# Patient Record
Sex: MALE | Race: WHITE | URBAN - METROPOLITAN AREA
[De-identification: names, ages, dates, MRNs, and addresses within clinical notes are randomized per-mention and may not be internally consistent; named-entity substitution may affect disease eponyms.]

---

## 2017-12-01 ENCOUNTER — AMBULATORY - HEALTHEAST (OUTPATIENT)
Dept: ADDICTION MEDICINE | Facility: HOSPITAL | Age: 47
End: 2017-12-01

## 2017-12-01 DIAGNOSIS — F15.20 METHAMPHETAMINE USE DISORDER, SEVERE (H): ICD-10-CM

## 2017-12-05 ENCOUNTER — OFFICE VISIT - HEALTHEAST (OUTPATIENT)
Dept: ADDICTION MEDICINE | Facility: HOSPITAL | Age: 47
End: 2017-12-05

## 2017-12-05 DIAGNOSIS — F15.20 METHAMPHETAMINE USE DISORDER, SEVERE (H): ICD-10-CM

## 2017-12-11 ENCOUNTER — AMBULATORY - HEALTHEAST (OUTPATIENT)
Dept: ADDICTION MEDICINE | Facility: HOSPITAL | Age: 47
End: 2017-12-11

## 2017-12-11 ENCOUNTER — OFFICE VISIT - HEALTHEAST (OUTPATIENT)
Dept: ADDICTION MEDICINE | Facility: HOSPITAL | Age: 47
End: 2017-12-11

## 2017-12-11 DIAGNOSIS — F15.20 METHAMPHETAMINE USE DISORDER, SEVERE (H): ICD-10-CM

## 2017-12-12 ENCOUNTER — OFFICE VISIT - HEALTHEAST (OUTPATIENT)
Dept: ADDICTION MEDICINE | Facility: HOSPITAL | Age: 47
End: 2017-12-12

## 2017-12-12 DIAGNOSIS — F15.20 METHAMPHETAMINE USE DISORDER, SEVERE (H): ICD-10-CM

## 2017-12-13 ENCOUNTER — COMMUNICATION - HEALTHEAST (OUTPATIENT)
Dept: ADDICTION MEDICINE | Facility: HOSPITAL | Age: 47
End: 2017-12-13

## 2017-12-15 ENCOUNTER — AMBULATORY - HEALTHEAST (OUTPATIENT)
Dept: ADDICTION MEDICINE | Facility: HOSPITAL | Age: 47
End: 2017-12-15

## 2017-12-18 ENCOUNTER — OFFICE VISIT - HEALTHEAST (OUTPATIENT)
Dept: ADDICTION MEDICINE | Facility: HOSPITAL | Age: 47
End: 2017-12-18

## 2017-12-18 ENCOUNTER — AMBULATORY - HEALTHEAST (OUTPATIENT)
Dept: LAB | Facility: HOSPITAL | Age: 47
End: 2017-12-18

## 2017-12-18 DIAGNOSIS — F15.20 METHAMPHETAMINE USE DISORDER, SEVERE (H): ICD-10-CM

## 2017-12-19 ENCOUNTER — OFFICE VISIT - HEALTHEAST (OUTPATIENT)
Dept: ADDICTION MEDICINE | Facility: HOSPITAL | Age: 47
End: 2017-12-19

## 2017-12-19 DIAGNOSIS — F15.20 METHAMPHETAMINE USE DISORDER, SEVERE (H): ICD-10-CM

## 2017-12-20 ENCOUNTER — AMBULATORY - HEALTHEAST (OUTPATIENT)
Dept: ADDICTION MEDICINE | Facility: HOSPITAL | Age: 47
End: 2017-12-20

## 2017-12-22 ENCOUNTER — OFFICE VISIT - HEALTHEAST (OUTPATIENT)
Dept: ADDICTION MEDICINE | Facility: HOSPITAL | Age: 47
End: 2017-12-22

## 2017-12-22 DIAGNOSIS — F15.20 METHAMPHETAMINE USE DISORDER, SEVERE (H): ICD-10-CM

## 2017-12-26 ENCOUNTER — COMMUNICATION - HEALTHEAST (OUTPATIENT)
Dept: ADDICTION MEDICINE | Facility: HOSPITAL | Age: 47
End: 2017-12-26

## 2017-12-27 ENCOUNTER — OFFICE VISIT - HEALTHEAST (OUTPATIENT)
Dept: ADDICTION MEDICINE | Facility: HOSPITAL | Age: 47
End: 2017-12-27

## 2017-12-27 ENCOUNTER — AMBULATORY - HEALTHEAST (OUTPATIENT)
Dept: ADDICTION MEDICINE | Facility: HOSPITAL | Age: 47
End: 2017-12-27

## 2017-12-27 DIAGNOSIS — F15.20 METHAMPHETAMINE USE DISORDER, SEVERE (H): ICD-10-CM

## 2018-01-03 ENCOUNTER — AMBULATORY - HEALTHEAST (OUTPATIENT)
Dept: ADDICTION MEDICINE | Facility: HOSPITAL | Age: 48
End: 2018-01-03

## 2018-01-03 ENCOUNTER — COMMUNICATION - HEALTHEAST (OUTPATIENT)
Dept: ADDICTION MEDICINE | Facility: HOSPITAL | Age: 48
End: 2018-01-03

## 2018-01-05 ENCOUNTER — COMMUNICATION - HEALTHEAST (OUTPATIENT)
Dept: ADDICTION MEDICINE | Facility: HOSPITAL | Age: 48
End: 2018-01-05

## 2018-02-12 ENCOUNTER — OFFICE VISIT - HEALTHEAST (OUTPATIENT)
Dept: ADDICTION MEDICINE | Facility: CLINIC | Age: 48
End: 2018-02-12

## 2018-02-12 DIAGNOSIS — F15.20 METHAMPHETAMINE USE DISORDER, SEVERE (H): ICD-10-CM

## 2018-02-13 ENCOUNTER — OFFICE VISIT - HEALTHEAST (OUTPATIENT)
Dept: ADDICTION MEDICINE | Facility: CLINIC | Age: 48
End: 2018-02-13

## 2018-02-13 DIAGNOSIS — F15.20 METHAMPHETAMINE USE DISORDER, SEVERE (H): ICD-10-CM

## 2018-02-14 ENCOUNTER — OFFICE VISIT - HEALTHEAST (OUTPATIENT)
Dept: ADDICTION MEDICINE | Facility: CLINIC | Age: 48
End: 2018-02-14

## 2018-02-14 ENCOUNTER — COMMUNICATION - HEALTHEAST (OUTPATIENT)
Dept: ADDICTION MEDICINE | Facility: CLINIC | Age: 48
End: 2018-02-14

## 2018-02-14 DIAGNOSIS — F15.20 METHAMPHETAMINE USE DISORDER, SEVERE (H): ICD-10-CM

## 2018-02-15 ENCOUNTER — AMBULATORY - HEALTHEAST (OUTPATIENT)
Dept: ADDICTION MEDICINE | Facility: CLINIC | Age: 48
End: 2018-02-15

## 2018-02-16 ENCOUNTER — AMBULATORY - HEALTHEAST (OUTPATIENT)
Dept: ADDICTION MEDICINE | Facility: CLINIC | Age: 48
End: 2018-02-16

## 2018-02-16 ENCOUNTER — COMMUNICATION - HEALTHEAST (OUTPATIENT)
Dept: BEHAVIORAL HEALTH | Facility: CLINIC | Age: 48
End: 2018-02-16

## 2018-02-19 ENCOUNTER — OFFICE VISIT - HEALTHEAST (OUTPATIENT)
Dept: ADDICTION MEDICINE | Facility: CLINIC | Age: 48
End: 2018-02-19

## 2018-02-19 DIAGNOSIS — F15.20 METHAMPHETAMINE USE DISORDER, SEVERE (H): ICD-10-CM

## 2018-02-21 ENCOUNTER — OFFICE VISIT - HEALTHEAST (OUTPATIENT)
Dept: ADDICTION MEDICINE | Facility: CLINIC | Age: 48
End: 2018-02-21

## 2018-02-21 DIAGNOSIS — F15.20 METHAMPHETAMINE USE DISORDER, SEVERE (H): ICD-10-CM

## 2018-02-23 ENCOUNTER — AMBULATORY - HEALTHEAST (OUTPATIENT)
Dept: ADDICTION MEDICINE | Facility: CLINIC | Age: 48
End: 2018-02-23

## 2018-02-26 ENCOUNTER — OFFICE VISIT - HEALTHEAST (OUTPATIENT)
Dept: ADDICTION MEDICINE | Facility: CLINIC | Age: 48
End: 2018-02-26

## 2018-02-26 DIAGNOSIS — F15.20 METHAMPHETAMINE USE DISORDER, SEVERE (H): ICD-10-CM

## 2018-02-27 ENCOUNTER — OFFICE VISIT - HEALTHEAST (OUTPATIENT)
Dept: ADDICTION MEDICINE | Facility: CLINIC | Age: 48
End: 2018-02-27

## 2018-02-27 DIAGNOSIS — F15.20 METHAMPHETAMINE USE DISORDER, SEVERE (H): ICD-10-CM

## 2018-02-28 ENCOUNTER — OFFICE VISIT - HEALTHEAST (OUTPATIENT)
Dept: ADDICTION MEDICINE | Facility: CLINIC | Age: 48
End: 2018-02-28

## 2018-02-28 DIAGNOSIS — F15.20 METHAMPHETAMINE USE DISORDER, SEVERE (H): ICD-10-CM

## 2018-03-02 ENCOUNTER — AMBULATORY - HEALTHEAST (OUTPATIENT)
Dept: ADDICTION MEDICINE | Facility: CLINIC | Age: 48
End: 2018-03-02

## 2018-03-09 ENCOUNTER — COMMUNICATION - HEALTHEAST (OUTPATIENT)
Dept: ADDICTION MEDICINE | Facility: CLINIC | Age: 48
End: 2018-03-09

## 2018-03-09 ENCOUNTER — AMBULATORY - HEALTHEAST (OUTPATIENT)
Dept: ADDICTION MEDICINE | Facility: CLINIC | Age: 48
End: 2018-03-09

## 2018-03-12 ENCOUNTER — OFFICE VISIT - HEALTHEAST (OUTPATIENT)
Dept: ADDICTION MEDICINE | Facility: CLINIC | Age: 48
End: 2018-03-12

## 2018-03-12 DIAGNOSIS — F15.20 METHAMPHETAMINE USE DISORDER, SEVERE (H): ICD-10-CM

## 2018-03-13 ENCOUNTER — OFFICE VISIT - HEALTHEAST (OUTPATIENT)
Dept: ADDICTION MEDICINE | Facility: CLINIC | Age: 48
End: 2018-03-13

## 2018-03-13 DIAGNOSIS — F15.20 METHAMPHETAMINE USE DISORDER, SEVERE (H): ICD-10-CM

## 2018-03-14 ENCOUNTER — AMBULATORY - HEALTHEAST (OUTPATIENT)
Dept: LAB | Facility: CLINIC | Age: 48
End: 2018-03-14

## 2018-03-14 ENCOUNTER — OFFICE VISIT - HEALTHEAST (OUTPATIENT)
Dept: ADDICTION MEDICINE | Facility: CLINIC | Age: 48
End: 2018-03-14

## 2018-03-14 DIAGNOSIS — F15.20 METHAMPHETAMINE USE DISORDER, SEVERE (H): ICD-10-CM

## 2018-03-14 LAB
AMPHETAMINES UR QL SCN: ABNORMAL
BARBITURATES UR QL: ABNORMAL
BENZODIAZ UR QL: ABNORMAL
CANNABINOIDS UR QL SCN: ABNORMAL
COCAINE UR QL: ABNORMAL
CREAT UR-MCNC: 72 MG/DL
METHADONE UR QL SCN: ABNORMAL
OPIATES UR QL SCN: ABNORMAL
OXYCODONE UR QL: ABNORMAL
PCP UR QL SCN: ABNORMAL

## 2018-03-16 ENCOUNTER — OFFICE VISIT - HEALTHEAST (OUTPATIENT)
Dept: ADDICTION MEDICINE | Facility: CLINIC | Age: 48
End: 2018-03-16

## 2018-03-16 DIAGNOSIS — F15.20 METHAMPHETAMINE USE DISORDER, SEVERE (H): ICD-10-CM

## 2018-03-17 ENCOUNTER — AMBULATORY - HEALTHEAST (OUTPATIENT)
Dept: ADDICTION MEDICINE | Facility: CLINIC | Age: 48
End: 2018-03-17

## 2018-03-20 ENCOUNTER — OFFICE VISIT - HEALTHEAST (OUTPATIENT)
Dept: ADDICTION MEDICINE | Facility: CLINIC | Age: 48
End: 2018-03-20

## 2018-03-20 DIAGNOSIS — F15.20 METHAMPHETAMINE USE DISORDER, SEVERE (H): ICD-10-CM

## 2018-03-21 ENCOUNTER — OFFICE VISIT - HEALTHEAST (OUTPATIENT)
Dept: ADDICTION MEDICINE | Facility: CLINIC | Age: 48
End: 2018-03-21

## 2018-03-21 DIAGNOSIS — F15.20 METHAMPHETAMINE USE DISORDER, SEVERE (H): ICD-10-CM

## 2018-03-23 ENCOUNTER — AMBULATORY - HEALTHEAST (OUTPATIENT)
Dept: ADDICTION MEDICINE | Facility: CLINIC | Age: 48
End: 2018-03-23

## 2018-03-26 ENCOUNTER — COMMUNICATION - HEALTHEAST (OUTPATIENT)
Dept: ADDICTION MEDICINE | Facility: CLINIC | Age: 48
End: 2018-03-26

## 2018-03-28 ENCOUNTER — AMBULATORY - HEALTHEAST (OUTPATIENT)
Dept: ADDICTION MEDICINE | Facility: CLINIC | Age: 48
End: 2018-03-28

## 2018-03-29 ENCOUNTER — COMMUNICATION - HEALTHEAST (OUTPATIENT)
Dept: BEHAVIORAL HEALTH | Facility: CLINIC | Age: 48
End: 2018-03-29

## 2018-03-30 ENCOUNTER — AMBULATORY - HEALTHEAST (OUTPATIENT)
Dept: LAB | Facility: CLINIC | Age: 48
End: 2018-03-30

## 2018-03-30 ENCOUNTER — OFFICE VISIT - HEALTHEAST (OUTPATIENT)
Dept: ADDICTION MEDICINE | Facility: CLINIC | Age: 48
End: 2018-03-30

## 2018-03-30 DIAGNOSIS — F15.20 METHAMPHETAMINE USE DISORDER, SEVERE (H): ICD-10-CM

## 2018-03-30 LAB
AMPHETAMINES UR QL SCN: NORMAL
BARBITURATES UR QL: NORMAL
BENZODIAZ UR QL: NORMAL
CANNABINOIDS UR QL SCN: NORMAL
COCAINE UR QL: NORMAL
CREAT UR-MCNC: 196.1 MG/DL
METHADONE UR QL SCN: NORMAL
OPIATES UR QL SCN: NORMAL
OXYCODONE UR QL: NORMAL
PCP UR QL SCN: NORMAL

## 2018-03-31 ENCOUNTER — AMBULATORY - HEALTHEAST (OUTPATIENT)
Dept: ADDICTION MEDICINE | Facility: CLINIC | Age: 48
End: 2018-03-31

## 2018-04-03 ENCOUNTER — OFFICE VISIT - HEALTHEAST (OUTPATIENT)
Dept: ADDICTION MEDICINE | Facility: CLINIC | Age: 48
End: 2018-04-03

## 2018-04-03 DIAGNOSIS — F15.20 METHAMPHETAMINE USE DISORDER, SEVERE (H): ICD-10-CM

## 2018-04-04 ENCOUNTER — OFFICE VISIT - HEALTHEAST (OUTPATIENT)
Dept: ADDICTION MEDICINE | Facility: CLINIC | Age: 48
End: 2018-04-04

## 2018-04-04 DIAGNOSIS — F15.20 METHAMPHETAMINE USE DISORDER, SEVERE (H): ICD-10-CM

## 2018-04-06 ENCOUNTER — OFFICE VISIT - HEALTHEAST (OUTPATIENT)
Dept: ADDICTION MEDICINE | Facility: CLINIC | Age: 48
End: 2018-04-06

## 2018-04-06 ENCOUNTER — AMBULATORY - HEALTHEAST (OUTPATIENT)
Dept: ADDICTION MEDICINE | Facility: CLINIC | Age: 48
End: 2018-04-06

## 2018-04-06 DIAGNOSIS — F15.20 METHAMPHETAMINE USE DISORDER, SEVERE (H): ICD-10-CM

## 2018-04-09 ENCOUNTER — AMBULATORY - HEALTHEAST (OUTPATIENT)
Dept: ADDICTION MEDICINE | Facility: CLINIC | Age: 48
End: 2018-04-09

## 2018-04-09 ENCOUNTER — OFFICE VISIT - HEALTHEAST (OUTPATIENT)
Dept: ADDICTION MEDICINE | Facility: CLINIC | Age: 48
End: 2018-04-09

## 2018-04-09 DIAGNOSIS — F15.20 METHAMPHETAMINE USE DISORDER, SEVERE (H): ICD-10-CM

## 2018-04-11 ENCOUNTER — OFFICE VISIT - HEALTHEAST (OUTPATIENT)
Dept: ADDICTION MEDICINE | Facility: CLINIC | Age: 48
End: 2018-04-11

## 2018-04-11 DIAGNOSIS — F15.20 METHAMPHETAMINE USE DISORDER, SEVERE (H): ICD-10-CM

## 2018-04-13 ENCOUNTER — OFFICE VISIT - HEALTHEAST (OUTPATIENT)
Dept: ADDICTION MEDICINE | Facility: CLINIC | Age: 48
End: 2018-04-13

## 2018-04-13 DIAGNOSIS — F15.20 METHAMPHETAMINE USE DISORDER, SEVERE (H): ICD-10-CM

## 2018-04-16 ENCOUNTER — AMBULATORY - HEALTHEAST (OUTPATIENT)
Dept: ADDICTION MEDICINE | Facility: CLINIC | Age: 48
End: 2018-04-16

## 2018-04-17 ENCOUNTER — OFFICE VISIT - HEALTHEAST (OUTPATIENT)
Dept: ADDICTION MEDICINE | Facility: CLINIC | Age: 48
End: 2018-04-17

## 2018-04-17 DIAGNOSIS — F15.20 METHAMPHETAMINE USE DISORDER, SEVERE (H): ICD-10-CM

## 2018-04-18 ENCOUNTER — OFFICE VISIT - HEALTHEAST (OUTPATIENT)
Dept: ADDICTION MEDICINE | Facility: CLINIC | Age: 48
End: 2018-04-18

## 2018-04-18 DIAGNOSIS — F15.20 METHAMPHETAMINE USE DISORDER, SEVERE (H): ICD-10-CM

## 2018-04-21 ENCOUNTER — AMBULATORY - HEALTHEAST (OUTPATIENT)
Dept: ADDICTION MEDICINE | Facility: CLINIC | Age: 48
End: 2018-04-21

## 2018-04-23 ENCOUNTER — OFFICE VISIT - HEALTHEAST (OUTPATIENT)
Dept: ADDICTION MEDICINE | Facility: CLINIC | Age: 48
End: 2018-04-23

## 2018-04-23 ENCOUNTER — AMBULATORY - HEALTHEAST (OUTPATIENT)
Dept: LAB | Facility: CLINIC | Age: 48
End: 2018-04-23

## 2018-04-23 DIAGNOSIS — F15.20 METHAMPHETAMINE USE DISORDER, SEVERE (H): ICD-10-CM

## 2018-04-23 LAB
AMPHETAMINES UR QL SCN: NORMAL
BARBITURATES UR QL: NORMAL
BENZODIAZ UR QL: NORMAL
CANNABINOIDS UR QL SCN: NORMAL
COCAINE UR QL: NORMAL
CREAT UR-MCNC: 145.9 MG/DL
METHADONE UR QL SCN: NORMAL
OPIATES UR QL SCN: NORMAL
OXYCODONE UR QL: NORMAL
PCP UR QL SCN: NORMAL

## 2018-04-25 ENCOUNTER — OFFICE VISIT - HEALTHEAST (OUTPATIENT)
Dept: BEHAVIORAL HEALTH | Facility: CLINIC | Age: 48
End: 2018-04-25

## 2018-04-25 ENCOUNTER — AMBULATORY - HEALTHEAST (OUTPATIENT)
Dept: ADDICTION MEDICINE | Facility: CLINIC | Age: 48
End: 2018-04-25

## 2018-04-25 DIAGNOSIS — F15.20 METHAMPHETAMINE USE DISORDER, SEVERE (H): ICD-10-CM

## 2018-04-25 DIAGNOSIS — F43.23 ADJUSTMENT DISORDER WITH MIXED ANXIETY AND DEPRESSED MOOD: ICD-10-CM

## 2018-04-29 ENCOUNTER — AMBULATORY - HEALTHEAST (OUTPATIENT)
Dept: ADDICTION MEDICINE | Facility: CLINIC | Age: 48
End: 2018-04-29

## 2018-04-30 ENCOUNTER — OFFICE VISIT - HEALTHEAST (OUTPATIENT)
Dept: ADDICTION MEDICINE | Facility: CLINIC | Age: 48
End: 2018-04-30

## 2018-04-30 DIAGNOSIS — F15.20 METHAMPHETAMINE USE DISORDER, SEVERE (H): ICD-10-CM

## 2018-05-02 ENCOUNTER — OFFICE VISIT - HEALTHEAST (OUTPATIENT)
Dept: ADDICTION MEDICINE | Facility: CLINIC | Age: 48
End: 2018-05-02

## 2018-05-02 DIAGNOSIS — F15.20 METHAMPHETAMINE USE DISORDER, SEVERE (H): ICD-10-CM

## 2018-05-04 ENCOUNTER — AMBULATORY - HEALTHEAST (OUTPATIENT)
Dept: ADDICTION MEDICINE | Facility: CLINIC | Age: 48
End: 2018-05-04

## 2018-05-07 ENCOUNTER — OFFICE VISIT - HEALTHEAST (OUTPATIENT)
Dept: ADDICTION MEDICINE | Facility: CLINIC | Age: 48
End: 2018-05-07

## 2018-05-07 DIAGNOSIS — F15.20 METHAMPHETAMINE USE DISORDER, SEVERE (H): ICD-10-CM

## 2018-05-09 ENCOUNTER — OFFICE VISIT - HEALTHEAST (OUTPATIENT)
Dept: BEHAVIORAL HEALTH | Facility: CLINIC | Age: 48
End: 2018-05-09

## 2018-05-09 DIAGNOSIS — Z91.199 NO-SHOW FOR APPOINTMENT: ICD-10-CM

## 2018-05-11 ENCOUNTER — OFFICE VISIT - HEALTHEAST (OUTPATIENT)
Dept: ADDICTION MEDICINE | Facility: CLINIC | Age: 48
End: 2018-05-11

## 2018-05-11 ENCOUNTER — AMBULATORY - HEALTHEAST (OUTPATIENT)
Dept: ADDICTION MEDICINE | Facility: CLINIC | Age: 48
End: 2018-05-11

## 2018-05-11 DIAGNOSIS — F15.20 METHAMPHETAMINE USE DISORDER, SEVERE (H): ICD-10-CM

## 2018-05-15 ENCOUNTER — COMMUNICATION - HEALTHEAST (OUTPATIENT)
Dept: ADDICTION MEDICINE | Facility: CLINIC | Age: 48
End: 2018-05-15

## 2018-05-16 ENCOUNTER — OFFICE VISIT - HEALTHEAST (OUTPATIENT)
Dept: ADDICTION MEDICINE | Facility: CLINIC | Age: 48
End: 2018-05-16

## 2018-05-16 DIAGNOSIS — F15.20 METHAMPHETAMINE USE DISORDER, SEVERE (H): ICD-10-CM

## 2018-05-17 ENCOUNTER — AMBULATORY - HEALTHEAST (OUTPATIENT)
Dept: ADDICTION MEDICINE | Facility: CLINIC | Age: 48
End: 2018-05-17

## 2018-05-18 ENCOUNTER — OFFICE VISIT - HEALTHEAST (OUTPATIENT)
Dept: BEHAVIORAL HEALTH | Facility: CLINIC | Age: 48
End: 2018-05-18

## 2018-05-18 DIAGNOSIS — Z91.199 NO-SHOW FOR APPOINTMENT: ICD-10-CM

## 2018-05-22 ENCOUNTER — OFFICE VISIT - HEALTHEAST (OUTPATIENT)
Dept: ADDICTION MEDICINE | Facility: CLINIC | Age: 48
End: 2018-05-22

## 2018-05-22 DIAGNOSIS — F15.20 METHAMPHETAMINE USE DISORDER, SEVERE (H): ICD-10-CM

## 2018-05-23 ENCOUNTER — OFFICE VISIT - HEALTHEAST (OUTPATIENT)
Dept: BEHAVIORAL HEALTH | Facility: CLINIC | Age: 48
End: 2018-05-23

## 2018-05-23 DIAGNOSIS — Z91.199 NO-SHOW FOR APPOINTMENT: ICD-10-CM

## 2018-05-24 ENCOUNTER — AMBULATORY - HEALTHEAST (OUTPATIENT)
Dept: ADDICTION MEDICINE | Facility: CLINIC | Age: 48
End: 2018-05-24

## 2018-05-24 ENCOUNTER — OFFICE VISIT - HEALTHEAST (OUTPATIENT)
Dept: BEHAVIORAL HEALTH | Facility: CLINIC | Age: 48
End: 2018-05-24

## 2018-05-24 DIAGNOSIS — Z91.199 NO-SHOW FOR APPOINTMENT: ICD-10-CM

## 2018-05-25 ENCOUNTER — AMBULATORY - HEALTHEAST (OUTPATIENT)
Dept: ADDICTION MEDICINE | Facility: CLINIC | Age: 48
End: 2018-05-25

## 2018-06-01 ENCOUNTER — AMBULATORY - HEALTHEAST (OUTPATIENT)
Dept: LAB | Facility: CLINIC | Age: 48
End: 2018-06-01

## 2018-06-01 ENCOUNTER — OFFICE VISIT - HEALTHEAST (OUTPATIENT)
Dept: ADDICTION MEDICINE | Facility: CLINIC | Age: 48
End: 2018-06-01

## 2018-06-01 ENCOUNTER — AMBULATORY - HEALTHEAST (OUTPATIENT)
Dept: ADDICTION MEDICINE | Facility: CLINIC | Age: 48
End: 2018-06-01

## 2018-06-01 DIAGNOSIS — F15.20 METHAMPHETAMINE USE DISORDER, SEVERE (H): ICD-10-CM

## 2018-06-01 LAB
AMPHETAMINES UR QL SCN: NORMAL
BARBITURATES UR QL: NORMAL
BENZODIAZ UR QL: NORMAL
CANNABINOIDS UR QL SCN: NORMAL
COCAINE UR QL: NORMAL
CREAT UR-MCNC: 46.1 MG/DL
METHADONE UR QL SCN: NORMAL
OPIATES UR QL SCN: NORMAL
OXYCODONE UR QL: NORMAL
PCP UR QL SCN: NORMAL

## 2018-06-05 ENCOUNTER — OFFICE VISIT - HEALTHEAST (OUTPATIENT)
Dept: ADDICTION MEDICINE | Facility: CLINIC | Age: 48
End: 2018-06-05

## 2018-06-05 DIAGNOSIS — F15.20 METHAMPHETAMINE USE DISORDER, SEVERE (H): ICD-10-CM

## 2018-06-08 ENCOUNTER — OFFICE VISIT - HEALTHEAST (OUTPATIENT)
Dept: ADDICTION MEDICINE | Facility: CLINIC | Age: 48
End: 2018-06-08

## 2018-06-08 ENCOUNTER — AMBULATORY - HEALTHEAST (OUTPATIENT)
Dept: ADDICTION MEDICINE | Facility: CLINIC | Age: 48
End: 2018-06-08

## 2018-06-08 DIAGNOSIS — F15.20 METHAMPHETAMINE USE DISORDER, SEVERE (H): ICD-10-CM

## 2018-06-13 ENCOUNTER — OFFICE VISIT - HEALTHEAST (OUTPATIENT)
Dept: ADDICTION MEDICINE | Facility: CLINIC | Age: 48
End: 2018-06-13

## 2018-06-13 DIAGNOSIS — F15.20 METHAMPHETAMINE USE DISORDER, SEVERE (H): ICD-10-CM

## 2018-06-14 ENCOUNTER — COMMUNICATION - HEALTHEAST (OUTPATIENT)
Dept: ADDICTION MEDICINE | Facility: CLINIC | Age: 48
End: 2018-06-14

## 2018-06-15 ENCOUNTER — AMBULATORY - HEALTHEAST (OUTPATIENT)
Dept: ADDICTION MEDICINE | Facility: CLINIC | Age: 48
End: 2018-06-15

## 2018-06-18 ENCOUNTER — OFFICE VISIT - HEALTHEAST (OUTPATIENT)
Dept: ADDICTION MEDICINE | Facility: CLINIC | Age: 48
End: 2018-06-18

## 2018-06-18 DIAGNOSIS — F15.20 METHAMPHETAMINE USE DISORDER, SEVERE (H): ICD-10-CM

## 2018-06-19 ENCOUNTER — OFFICE VISIT - HEALTHEAST (OUTPATIENT)
Dept: ADDICTION MEDICINE | Facility: CLINIC | Age: 48
End: 2018-06-19

## 2018-06-19 DIAGNOSIS — F15.20 METHAMPHETAMINE USE DISORDER, SEVERE (H): ICD-10-CM

## 2018-06-22 ENCOUNTER — AMBULATORY - HEALTHEAST (OUTPATIENT)
Dept: ADDICTION MEDICINE | Facility: CLINIC | Age: 48
End: 2018-06-22

## 2018-06-29 ENCOUNTER — AMBULATORY - HEALTHEAST (OUTPATIENT)
Dept: ADDICTION MEDICINE | Facility: CLINIC | Age: 48
End: 2018-06-29

## 2018-07-05 ENCOUNTER — AMBULATORY - HEALTHEAST (OUTPATIENT)
Dept: ADDICTION MEDICINE | Facility: CLINIC | Age: 48
End: 2018-07-05

## 2018-07-06 ENCOUNTER — OFFICE VISIT - HEALTHEAST (OUTPATIENT)
Dept: ADDICTION MEDICINE | Facility: CLINIC | Age: 48
End: 2018-07-06

## 2018-07-06 DIAGNOSIS — F15.20 METHAMPHETAMINE USE DISORDER, SEVERE (H): ICD-10-CM

## 2018-07-10 ENCOUNTER — AMBULATORY - HEALTHEAST (OUTPATIENT)
Dept: ADDICTION MEDICINE | Facility: CLINIC | Age: 48
End: 2018-07-10

## 2018-07-11 ENCOUNTER — AMBULATORY - HEALTHEAST (OUTPATIENT)
Dept: LAB | Facility: CLINIC | Age: 48
End: 2018-07-11

## 2018-07-11 ENCOUNTER — OFFICE VISIT - HEALTHEAST (OUTPATIENT)
Dept: ADDICTION MEDICINE | Facility: CLINIC | Age: 48
End: 2018-07-11

## 2018-07-11 DIAGNOSIS — F15.20 METHAMPHETAMINE USE DISORDER, SEVERE (H): ICD-10-CM

## 2018-07-11 LAB
AMPHETAMINES UR QL SCN: NORMAL
BARBITURATES UR QL: NORMAL
BENZODIAZ UR QL: NORMAL
CANNABINOIDS UR QL SCN: NORMAL
COCAINE UR QL: NORMAL
CREAT UR-MCNC: 295.5 MG/DL
METHADONE UR QL SCN: NORMAL
OPIATES UR QL SCN: NORMAL
OXYCODONE UR QL: NORMAL
PCP UR QL SCN: NORMAL

## 2018-07-12 ENCOUNTER — AMBULATORY - HEALTHEAST (OUTPATIENT)
Dept: ADDICTION MEDICINE | Facility: CLINIC | Age: 48
End: 2018-07-12

## 2018-07-16 ENCOUNTER — OFFICE VISIT - HEALTHEAST (OUTPATIENT)
Dept: ADDICTION MEDICINE | Facility: CLINIC | Age: 48
End: 2018-07-16

## 2018-07-16 DIAGNOSIS — F15.20 METHAMPHETAMINE USE DISORDER, SEVERE (H): ICD-10-CM

## 2018-07-19 ENCOUNTER — AMBULATORY - HEALTHEAST (OUTPATIENT)
Dept: ADDICTION MEDICINE | Facility: CLINIC | Age: 48
End: 2018-07-19

## 2018-07-20 ENCOUNTER — OFFICE VISIT - HEALTHEAST (OUTPATIENT)
Dept: ADDICTION MEDICINE | Facility: CLINIC | Age: 48
End: 2018-07-20

## 2018-07-20 DIAGNOSIS — F15.20 METHAMPHETAMINE USE DISORDER, SEVERE (H): ICD-10-CM

## 2018-07-26 ENCOUNTER — AMBULATORY - HEALTHEAST (OUTPATIENT)
Dept: ADDICTION MEDICINE | Facility: CLINIC | Age: 48
End: 2018-07-26

## 2018-07-30 ENCOUNTER — OFFICE VISIT - HEALTHEAST (OUTPATIENT)
Dept: ADDICTION MEDICINE | Facility: CLINIC | Age: 48
End: 2018-07-30

## 2018-07-30 DIAGNOSIS — F15.20 METHAMPHETAMINE USE DISORDER, SEVERE (H): ICD-10-CM

## 2018-08-03 ENCOUNTER — OFFICE VISIT - HEALTHEAST (OUTPATIENT)
Dept: ADDICTION MEDICINE | Facility: CLINIC | Age: 48
End: 2018-08-03

## 2018-08-03 ENCOUNTER — AMBULATORY - HEALTHEAST (OUTPATIENT)
Dept: ADDICTION MEDICINE | Facility: CLINIC | Age: 48
End: 2018-08-03

## 2018-08-03 DIAGNOSIS — F15.20 METHAMPHETAMINE USE DISORDER, SEVERE (H): ICD-10-CM

## 2018-08-07 ENCOUNTER — OFFICE VISIT - HEALTHEAST (OUTPATIENT)
Dept: ADDICTION MEDICINE | Facility: CLINIC | Age: 48
End: 2018-08-07

## 2018-08-07 DIAGNOSIS — F15.20 METHAMPHETAMINE USE DISORDER, SEVERE (H): ICD-10-CM

## 2018-08-09 ENCOUNTER — AMBULATORY - HEALTHEAST (OUTPATIENT)
Dept: ADDICTION MEDICINE | Facility: CLINIC | Age: 48
End: 2018-08-09

## 2018-08-15 ENCOUNTER — OFFICE VISIT - HEALTHEAST (OUTPATIENT)
Dept: ADDICTION MEDICINE | Facility: CLINIC | Age: 48
End: 2018-08-15

## 2018-08-15 DIAGNOSIS — F15.20 METHAMPHETAMINE USE DISORDER, SEVERE (H): ICD-10-CM

## 2018-08-18 ENCOUNTER — AMBULATORY - HEALTHEAST (OUTPATIENT)
Dept: ADDICTION MEDICINE | Facility: CLINIC | Age: 48
End: 2018-08-18

## 2018-08-21 ENCOUNTER — COMMUNICATION - HEALTHEAST (OUTPATIENT)
Dept: ADDICTION MEDICINE | Facility: CLINIC | Age: 48
End: 2018-08-21

## 2018-08-24 ENCOUNTER — AMBULATORY - HEALTHEAST (OUTPATIENT)
Dept: ADDICTION MEDICINE | Facility: CLINIC | Age: 48
End: 2018-08-24

## 2018-08-28 ENCOUNTER — OFFICE VISIT - HEALTHEAST (OUTPATIENT)
Dept: ADDICTION MEDICINE | Facility: CLINIC | Age: 48
End: 2018-08-28

## 2018-08-28 DIAGNOSIS — F15.20 METHAMPHETAMINE USE DISORDER, SEVERE (H): ICD-10-CM

## 2018-08-30 ENCOUNTER — AMBULATORY - HEALTHEAST (OUTPATIENT)
Dept: ADDICTION MEDICINE | Facility: CLINIC | Age: 48
End: 2018-08-30

## 2018-09-07 ENCOUNTER — AMBULATORY - HEALTHEAST (OUTPATIENT)
Dept: ADDICTION MEDICINE | Facility: CLINIC | Age: 48
End: 2018-09-07

## 2018-09-14 ENCOUNTER — AMBULATORY - HEALTHEAST (OUTPATIENT)
Dept: ADDICTION MEDICINE | Facility: CLINIC | Age: 48
End: 2018-09-14

## 2018-09-14 ENCOUNTER — AMBULATORY - HEALTHEAST (OUTPATIENT)
Dept: LAB | Facility: CLINIC | Age: 48
End: 2018-09-14

## 2018-09-14 ENCOUNTER — OFFICE VISIT - HEALTHEAST (OUTPATIENT)
Dept: ADDICTION MEDICINE | Facility: CLINIC | Age: 48
End: 2018-09-14

## 2018-09-14 DIAGNOSIS — F15.20 METHAMPHETAMINE USE DISORDER, SEVERE (H): ICD-10-CM

## 2018-09-14 LAB
AMPHETAMINES UR QL SCN: NORMAL
BARBITURATES UR QL: NORMAL
BENZODIAZ UR QL: NORMAL
CANNABINOIDS UR QL SCN: NORMAL
COCAINE UR QL: NORMAL
CREAT UR-MCNC: 24.4 MG/DL
METHADONE UR QL SCN: NORMAL
OPIATES UR QL SCN: NORMAL
OXYCODONE UR QL: NORMAL
PCP UR QL SCN: NORMAL

## 2018-09-20 ENCOUNTER — AMBULATORY - HEALTHEAST (OUTPATIENT)
Dept: ADDICTION MEDICINE | Facility: CLINIC | Age: 48
End: 2018-09-20

## 2018-09-24 ENCOUNTER — AMBULATORY - HEALTHEAST (OUTPATIENT)
Dept: ADDICTION MEDICINE | Facility: CLINIC | Age: 48
End: 2018-09-24

## 2018-09-28 ENCOUNTER — COMMUNICATION - HEALTHEAST (OUTPATIENT)
Dept: ADDICTION MEDICINE | Facility: CLINIC | Age: 48
End: 2018-09-28

## 2019-03-12 ENCOUNTER — OFFICE VISIT - HEALTHEAST (OUTPATIENT)
Dept: ADDICTION MEDICINE | Facility: CLINIC | Age: 49
End: 2019-03-12

## 2019-03-12 DIAGNOSIS — F15.20 METHAMPHETAMINE USE DISORDER, SEVERE (H): ICD-10-CM

## 2019-03-12 DIAGNOSIS — F10.20 MODERATE ALCOHOL USE DISORDER (H): ICD-10-CM

## 2019-03-19 ENCOUNTER — AMBULATORY - HEALTHEAST (OUTPATIENT)
Dept: ADDICTION MEDICINE | Facility: CLINIC | Age: 49
End: 2019-03-19

## 2019-03-19 ENCOUNTER — OFFICE VISIT - HEALTHEAST (OUTPATIENT)
Dept: ADDICTION MEDICINE | Facility: CLINIC | Age: 49
End: 2019-03-19

## 2019-03-19 DIAGNOSIS — F15.20 METHAMPHETAMINE USE DISORDER, SEVERE (H): ICD-10-CM

## 2019-03-19 DIAGNOSIS — F10.20 MODERATE ALCOHOL USE DISORDER (H): ICD-10-CM

## 2019-03-22 ENCOUNTER — AMBULATORY - HEALTHEAST (OUTPATIENT)
Dept: ADDICTION MEDICINE | Facility: CLINIC | Age: 49
End: 2019-03-22

## 2019-03-25 ENCOUNTER — OFFICE VISIT - HEALTHEAST (OUTPATIENT)
Dept: ADDICTION MEDICINE | Facility: CLINIC | Age: 49
End: 2019-03-25

## 2019-03-25 DIAGNOSIS — F33.1 MAJOR DEPRESSIVE DISORDER, RECURRENT EPISODE, MODERATE (H): ICD-10-CM

## 2019-03-25 DIAGNOSIS — F41.1 GENERALIZED ANXIETY DISORDER: ICD-10-CM

## 2019-03-25 DIAGNOSIS — F10.20 MODERATE ALCOHOL USE DISORDER (H): ICD-10-CM

## 2019-03-25 DIAGNOSIS — F15.20 METHAMPHETAMINE USE DISORDER, SEVERE (H): ICD-10-CM

## 2019-03-27 ENCOUNTER — OFFICE VISIT - HEALTHEAST (OUTPATIENT)
Dept: ADDICTION MEDICINE | Facility: CLINIC | Age: 49
End: 2019-03-27

## 2019-03-27 DIAGNOSIS — F15.20 METHAMPHETAMINE USE DISORDER, SEVERE (H): ICD-10-CM

## 2019-03-29 ENCOUNTER — OFFICE VISIT - HEALTHEAST (OUTPATIENT)
Dept: ADDICTION MEDICINE | Facility: CLINIC | Age: 49
End: 2019-03-29

## 2019-03-29 ENCOUNTER — AMBULATORY - HEALTHEAST (OUTPATIENT)
Dept: ADDICTION MEDICINE | Facility: CLINIC | Age: 49
End: 2019-03-29

## 2019-03-29 DIAGNOSIS — F15.20 METHAMPHETAMINE USE DISORDER, SEVERE (H): ICD-10-CM

## 2019-04-01 ENCOUNTER — OFFICE VISIT - HEALTHEAST (OUTPATIENT)
Dept: ADDICTION MEDICINE | Facility: CLINIC | Age: 49
End: 2019-04-01

## 2019-04-01 DIAGNOSIS — F15.20 METHAMPHETAMINE USE DISORDER, SEVERE (H): ICD-10-CM

## 2019-04-03 ENCOUNTER — OFFICE VISIT - HEALTHEAST (OUTPATIENT)
Dept: ADDICTION MEDICINE | Facility: CLINIC | Age: 49
End: 2019-04-03

## 2019-04-03 DIAGNOSIS — F15.20 METHAMPHETAMINE USE DISORDER, SEVERE (H): ICD-10-CM

## 2019-04-05 ENCOUNTER — AMBULATORY - HEALTHEAST (OUTPATIENT)
Dept: ADDICTION MEDICINE | Facility: CLINIC | Age: 49
End: 2019-04-05

## 2019-04-09 ENCOUNTER — OFFICE VISIT - HEALTHEAST (OUTPATIENT)
Dept: ADDICTION MEDICINE | Facility: CLINIC | Age: 49
End: 2019-04-09

## 2019-04-09 ENCOUNTER — COMMUNICATION - HEALTHEAST (OUTPATIENT)
Dept: ADDICTION MEDICINE | Facility: CLINIC | Age: 49
End: 2019-04-09

## 2019-04-09 DIAGNOSIS — F15.20 METHAMPHETAMINE USE DISORDER, SEVERE (H): ICD-10-CM

## 2019-04-10 ENCOUNTER — OFFICE VISIT - HEALTHEAST (OUTPATIENT)
Dept: ADDICTION MEDICINE | Facility: CLINIC | Age: 49
End: 2019-04-10

## 2019-04-10 DIAGNOSIS — F15.20 METHAMPHETAMINE USE DISORDER, SEVERE (H): ICD-10-CM

## 2019-04-12 ENCOUNTER — AMBULATORY - HEALTHEAST (OUTPATIENT)
Dept: ADDICTION MEDICINE | Facility: CLINIC | Age: 49
End: 2019-04-12

## 2019-04-17 ENCOUNTER — OFFICE VISIT - HEALTHEAST (OUTPATIENT)
Dept: ADDICTION MEDICINE | Facility: CLINIC | Age: 49
End: 2019-04-17

## 2019-04-17 DIAGNOSIS — F15.20 METHAMPHETAMINE USE DISORDER, SEVERE (H): ICD-10-CM

## 2019-04-22 ENCOUNTER — AMBULATORY - HEALTHEAST (OUTPATIENT)
Dept: ADDICTION MEDICINE | Facility: CLINIC | Age: 49
End: 2019-04-22

## 2019-04-22 ENCOUNTER — OFFICE VISIT - HEALTHEAST (OUTPATIENT)
Dept: ADDICTION MEDICINE | Facility: CLINIC | Age: 49
End: 2019-04-22

## 2019-04-22 DIAGNOSIS — F15.20 METHAMPHETAMINE USE DISORDER, SEVERE (H): ICD-10-CM

## 2019-04-23 ENCOUNTER — OFFICE VISIT - HEALTHEAST (OUTPATIENT)
Dept: ADDICTION MEDICINE | Facility: CLINIC | Age: 49
End: 2019-04-23

## 2019-04-23 DIAGNOSIS — F15.20 METHAMPHETAMINE USE DISORDER, SEVERE (H): ICD-10-CM

## 2019-04-26 ENCOUNTER — OFFICE VISIT - HEALTHEAST (OUTPATIENT)
Dept: ADDICTION MEDICINE | Facility: CLINIC | Age: 49
End: 2019-04-26

## 2019-04-26 ENCOUNTER — AMBULATORY - HEALTHEAST (OUTPATIENT)
Dept: ADDICTION MEDICINE | Facility: CLINIC | Age: 49
End: 2019-04-26

## 2019-04-26 DIAGNOSIS — F15.20 METHAMPHETAMINE USE DISORDER, SEVERE (H): ICD-10-CM

## 2019-04-30 ENCOUNTER — OFFICE VISIT - HEALTHEAST (OUTPATIENT)
Dept: ADDICTION MEDICINE | Facility: CLINIC | Age: 49
End: 2019-04-30

## 2019-04-30 DIAGNOSIS — F15.20 METHAMPHETAMINE USE DISORDER, SEVERE (H): ICD-10-CM

## 2019-05-02 ENCOUNTER — AMBULATORY - HEALTHEAST (OUTPATIENT)
Dept: ADDICTION MEDICINE | Facility: CLINIC | Age: 49
End: 2019-05-02

## 2019-05-03 ENCOUNTER — OFFICE VISIT - HEALTHEAST (OUTPATIENT)
Dept: ADDICTION MEDICINE | Facility: CLINIC | Age: 49
End: 2019-05-03

## 2019-05-03 DIAGNOSIS — F15.20 METHAMPHETAMINE USE DISORDER, SEVERE (H): ICD-10-CM

## 2019-05-08 ENCOUNTER — OFFICE VISIT - HEALTHEAST (OUTPATIENT)
Dept: ADDICTION MEDICINE | Facility: CLINIC | Age: 49
End: 2019-05-08

## 2019-05-08 DIAGNOSIS — F15.20 METHAMPHETAMINE USE DISORDER, SEVERE (H): ICD-10-CM

## 2019-05-10 ENCOUNTER — AMBULATORY - HEALTHEAST (OUTPATIENT)
Dept: ADDICTION MEDICINE | Facility: CLINIC | Age: 49
End: 2019-05-10

## 2019-05-13 ENCOUNTER — OFFICE VISIT - HEALTHEAST (OUTPATIENT)
Dept: ADDICTION MEDICINE | Facility: CLINIC | Age: 49
End: 2019-05-13

## 2019-05-13 DIAGNOSIS — F15.20 METHAMPHETAMINE USE DISORDER, SEVERE (H): ICD-10-CM

## 2019-05-14 ENCOUNTER — AMBULATORY - HEALTHEAST (OUTPATIENT)
Dept: ADDICTION MEDICINE | Facility: CLINIC | Age: 49
End: 2019-05-14

## 2019-05-17 ENCOUNTER — OFFICE VISIT - HEALTHEAST (OUTPATIENT)
Dept: ADDICTION MEDICINE | Facility: CLINIC | Age: 49
End: 2019-05-17

## 2019-05-17 ENCOUNTER — AMBULATORY - HEALTHEAST (OUTPATIENT)
Dept: ADDICTION MEDICINE | Facility: CLINIC | Age: 49
End: 2019-05-17

## 2019-05-17 ENCOUNTER — AMBULATORY - HEALTHEAST (OUTPATIENT)
Dept: LAB | Facility: CLINIC | Age: 49
End: 2019-05-17

## 2019-05-17 DIAGNOSIS — F15.20 METHAMPHETAMINE USE DISORDER, SEVERE (H): ICD-10-CM

## 2019-05-17 DIAGNOSIS — F10.20 MODERATE ALCOHOL USE DISORDER (H): ICD-10-CM

## 2019-05-17 LAB
AMPHETAMINES UR QL SCN: ABNORMAL
BARBITURATES UR QL: ABNORMAL
BENZODIAZ UR QL: ABNORMAL
CANNABINOIDS UR QL SCN: ABNORMAL
COCAINE UR QL: ABNORMAL
CREAT UR-MCNC: 47.9 MG/DL
ETHANOL UR CFM-MCNC: <10 MG/DL
METHADONE UR QL SCN: ABNORMAL
OPIATES UR QL SCN: ABNORMAL
OXYCODONE UR QL: ABNORMAL
PCP UR QL SCN: ABNORMAL

## 2019-05-23 ENCOUNTER — AMBULATORY - HEALTHEAST (OUTPATIENT)
Dept: ADDICTION MEDICINE | Facility: CLINIC | Age: 49
End: 2019-05-23

## 2019-05-24 ENCOUNTER — OFFICE VISIT - HEALTHEAST (OUTPATIENT)
Dept: ADDICTION MEDICINE | Facility: CLINIC | Age: 49
End: 2019-05-24

## 2019-05-24 ENCOUNTER — AMBULATORY - HEALTHEAST (OUTPATIENT)
Dept: ADDICTION MEDICINE | Facility: CLINIC | Age: 49
End: 2019-05-24

## 2019-05-24 DIAGNOSIS — F15.20 METHAMPHETAMINE USE DISORDER, SEVERE (H): ICD-10-CM

## 2019-05-29 ENCOUNTER — OFFICE VISIT - HEALTHEAST (OUTPATIENT)
Dept: ADDICTION MEDICINE | Facility: CLINIC | Age: 49
End: 2019-05-29

## 2019-05-29 ENCOUNTER — AMBULATORY - HEALTHEAST (OUTPATIENT)
Dept: LAB | Facility: CLINIC | Age: 49
End: 2019-05-29

## 2019-05-29 DIAGNOSIS — F15.20 METHAMPHETAMINE USE DISORDER, SEVERE (H): ICD-10-CM

## 2019-05-29 DIAGNOSIS — F10.20 MODERATE ALCOHOL USE DISORDER (H): ICD-10-CM

## 2019-05-29 LAB
AMPHETAMINES UR QL SCN: NORMAL
BARBITURATES UR QL: NORMAL
BENZODIAZ UR QL: NORMAL
CANNABINOIDS UR QL SCN: NORMAL
COCAINE UR QL: NORMAL
CREAT UR-MCNC: 177.1 MG/DL
ETHANOL UR CFM-MCNC: <10 MG/DL
METHADONE UR QL SCN: NORMAL
OPIATES UR QL SCN: NORMAL
OXYCODONE UR QL: NORMAL
PCP UR QL SCN: NORMAL

## 2019-05-31 ENCOUNTER — AMBULATORY - HEALTHEAST (OUTPATIENT)
Dept: ADDICTION MEDICINE | Facility: CLINIC | Age: 49
End: 2019-05-31

## 2019-06-03 ENCOUNTER — OFFICE VISIT - HEALTHEAST (OUTPATIENT)
Dept: ADDICTION MEDICINE | Facility: CLINIC | Age: 49
End: 2019-06-03

## 2019-06-03 DIAGNOSIS — F15.20 METHAMPHETAMINE USE DISORDER, SEVERE (H): ICD-10-CM

## 2019-06-07 ENCOUNTER — COMMUNICATION - HEALTHEAST (OUTPATIENT)
Dept: ADDICTION MEDICINE | Facility: CLINIC | Age: 49
End: 2019-06-07

## 2019-06-07 ENCOUNTER — AMBULATORY - HEALTHEAST (OUTPATIENT)
Dept: ADDICTION MEDICINE | Facility: CLINIC | Age: 49
End: 2019-06-07

## 2019-06-07 ENCOUNTER — OFFICE VISIT - HEALTHEAST (OUTPATIENT)
Dept: ADDICTION MEDICINE | Facility: CLINIC | Age: 49
End: 2019-06-07

## 2019-06-07 DIAGNOSIS — F15.20 METHAMPHETAMINE USE DISORDER, SEVERE (H): ICD-10-CM

## 2019-06-10 ENCOUNTER — OFFICE VISIT - HEALTHEAST (OUTPATIENT)
Dept: ADDICTION MEDICINE | Facility: CLINIC | Age: 49
End: 2019-06-10

## 2019-06-10 DIAGNOSIS — F15.20 METHAMPHETAMINE USE DISORDER, SEVERE (H): ICD-10-CM

## 2019-06-12 ENCOUNTER — OFFICE VISIT - HEALTHEAST (OUTPATIENT)
Dept: ADDICTION MEDICINE | Facility: CLINIC | Age: 49
End: 2019-06-12

## 2019-06-12 DIAGNOSIS — F15.20 METHAMPHETAMINE USE DISORDER, SEVERE (H): ICD-10-CM

## 2019-06-14 ENCOUNTER — AMBULATORY - HEALTHEAST (OUTPATIENT)
Dept: LAB | Facility: CLINIC | Age: 49
End: 2019-06-14

## 2019-06-14 ENCOUNTER — OFFICE VISIT - HEALTHEAST (OUTPATIENT)
Dept: ADDICTION MEDICINE | Facility: CLINIC | Age: 49
End: 2019-06-14

## 2019-06-14 ENCOUNTER — AMBULATORY - HEALTHEAST (OUTPATIENT)
Dept: ADDICTION MEDICINE | Facility: CLINIC | Age: 49
End: 2019-06-14

## 2019-06-14 DIAGNOSIS — F15.20 METHAMPHETAMINE USE DISORDER, SEVERE (H): ICD-10-CM

## 2019-06-14 DIAGNOSIS — F10.20 MODERATE ALCOHOL USE DISORDER (H): ICD-10-CM

## 2019-06-14 LAB
AMPHETAMINES UR QL SCN: NORMAL
BARBITURATES UR QL: NORMAL
BENZODIAZ UR QL: NORMAL
CANNABINOIDS UR QL SCN: NORMAL
COCAINE UR QL: NORMAL
CREAT UR-MCNC: 33.5 MG/DL
ETHANOL UR CFM-MCNC: <10 MG/DL
METHADONE UR QL SCN: NORMAL
OPIATES UR QL SCN: NORMAL
OXYCODONE UR QL: NORMAL
PCP UR QL SCN: NORMAL

## 2019-06-19 ENCOUNTER — OFFICE VISIT - HEALTHEAST (OUTPATIENT)
Dept: ADDICTION MEDICINE | Facility: CLINIC | Age: 49
End: 2019-06-19

## 2019-06-19 DIAGNOSIS — F15.20 METHAMPHETAMINE USE DISORDER, SEVERE (H): ICD-10-CM

## 2019-06-21 ENCOUNTER — OFFICE VISIT - HEALTHEAST (OUTPATIENT)
Dept: ADDICTION MEDICINE | Facility: CLINIC | Age: 49
End: 2019-06-21

## 2019-06-21 ENCOUNTER — AMBULATORY - HEALTHEAST (OUTPATIENT)
Dept: LAB | Facility: CLINIC | Age: 49
End: 2019-06-21

## 2019-06-21 ENCOUNTER — COMMUNICATION - HEALTHEAST (OUTPATIENT)
Dept: ADDICTION MEDICINE | Facility: CLINIC | Age: 49
End: 2019-06-21

## 2019-06-21 DIAGNOSIS — F15.20 METHAMPHETAMINE USE DISORDER, SEVERE (H): ICD-10-CM

## 2019-06-21 DIAGNOSIS — F10.20 MODERATE ALCOHOL USE DISORDER (H): ICD-10-CM

## 2019-06-21 LAB
AMPHETAMINES UR QL SCN: NORMAL
BARBITURATES UR QL: NORMAL
BENZODIAZ UR QL: NORMAL
CANNABINOIDS UR QL SCN: NORMAL
COCAINE UR QL: NORMAL
CREAT UR-MCNC: 15.8 MG/DL
ETHANOL UR CFM-MCNC: <10 MG/DL
METHADONE UR QL SCN: NORMAL
OPIATES UR QL SCN: NORMAL
OXYCODONE UR QL: NORMAL
PCP UR QL SCN: NORMAL

## 2019-06-22 ENCOUNTER — AMBULATORY - HEALTHEAST (OUTPATIENT)
Dept: ADDICTION MEDICINE | Facility: CLINIC | Age: 49
End: 2019-06-22

## 2019-06-24 ENCOUNTER — OFFICE VISIT - HEALTHEAST (OUTPATIENT)
Dept: ADDICTION MEDICINE | Facility: CLINIC | Age: 49
End: 2019-06-24

## 2019-06-24 DIAGNOSIS — F15.20 METHAMPHETAMINE USE DISORDER, SEVERE (H): ICD-10-CM

## 2019-06-25 ENCOUNTER — OFFICE VISIT - HEALTHEAST (OUTPATIENT)
Dept: ADDICTION MEDICINE | Facility: CLINIC | Age: 49
End: 2019-06-25

## 2019-06-25 DIAGNOSIS — F15.20 METHAMPHETAMINE USE DISORDER, SEVERE (H): ICD-10-CM

## 2019-06-28 ENCOUNTER — AMBULATORY - HEALTHEAST (OUTPATIENT)
Dept: ADDICTION MEDICINE | Facility: CLINIC | Age: 49
End: 2019-06-28

## 2019-07-01 ENCOUNTER — COMMUNICATION - HEALTHEAST (OUTPATIENT)
Dept: ADDICTION MEDICINE | Facility: CLINIC | Age: 49
End: 2019-07-01

## 2019-07-11 ENCOUNTER — COMMUNICATION - HEALTHEAST (OUTPATIENT)
Dept: ADDICTION MEDICINE | Facility: CLINIC | Age: 49
End: 2019-07-11

## 2019-08-13 ENCOUNTER — AMBULATORY - HEALTHEAST (OUTPATIENT)
Dept: ADDICTION MEDICINE | Facility: CLINIC | Age: 49
End: 2019-08-13

## 2019-08-14 ENCOUNTER — AMBULATORY - HEALTHEAST (OUTPATIENT)
Dept: ADDICTION MEDICINE | Facility: CLINIC | Age: 49
End: 2019-08-14

## 2019-08-15 ENCOUNTER — AMBULATORY - HEALTHEAST (OUTPATIENT)
Dept: ADDICTION MEDICINE | Facility: CLINIC | Age: 49
End: 2019-08-15

## 2019-08-20 ENCOUNTER — AMBULATORY - HEALTHEAST (OUTPATIENT)
Dept: ADDICTION MEDICINE | Facility: CLINIC | Age: 49
End: 2019-08-20

## 2019-08-21 ENCOUNTER — AMBULATORY - HEALTHEAST (OUTPATIENT)
Dept: ADDICTION MEDICINE | Facility: CLINIC | Age: 49
End: 2019-08-21

## 2019-08-22 ENCOUNTER — OFFICE VISIT - HEALTHEAST (OUTPATIENT)
Dept: ADDICTION MEDICINE | Facility: CLINIC | Age: 49
End: 2019-08-22

## 2019-08-22 ENCOUNTER — AMBULATORY - HEALTHEAST (OUTPATIENT)
Dept: ADDICTION MEDICINE | Facility: CLINIC | Age: 49
End: 2019-08-22

## 2019-08-22 DIAGNOSIS — F15.20 METHAMPHETAMINE USE DISORDER, SEVERE (H): ICD-10-CM

## 2019-08-27 ENCOUNTER — AMBULATORY - HEALTHEAST (OUTPATIENT)
Dept: ADDICTION MEDICINE | Facility: CLINIC | Age: 49
End: 2019-08-27

## 2019-08-28 ENCOUNTER — AMBULATORY - HEALTHEAST (OUTPATIENT)
Dept: ADDICTION MEDICINE | Facility: CLINIC | Age: 49
End: 2019-08-28

## 2019-08-29 ENCOUNTER — OFFICE VISIT - HEALTHEAST (OUTPATIENT)
Dept: ADDICTION MEDICINE | Facility: CLINIC | Age: 49
End: 2019-08-29

## 2019-08-29 ENCOUNTER — AMBULATORY - HEALTHEAST (OUTPATIENT)
Dept: ADDICTION MEDICINE | Facility: CLINIC | Age: 49
End: 2019-08-29

## 2019-08-29 DIAGNOSIS — F15.20 METHAMPHETAMINE USE DISORDER, SEVERE (H): ICD-10-CM

## 2019-08-30 ENCOUNTER — AMBULATORY - HEALTHEAST (OUTPATIENT)
Dept: ADDICTION MEDICINE | Facility: CLINIC | Age: 49
End: 2019-08-30

## 2019-09-03 ENCOUNTER — AMBULATORY - HEALTHEAST (OUTPATIENT)
Dept: ADDICTION MEDICINE | Facility: CLINIC | Age: 49
End: 2019-09-03

## 2019-09-05 ENCOUNTER — AMBULATORY - HEALTHEAST (OUTPATIENT)
Dept: LAB | Facility: CLINIC | Age: 49
End: 2019-09-05

## 2019-09-05 ENCOUNTER — AMBULATORY - HEALTHEAST (OUTPATIENT)
Dept: ADDICTION MEDICINE | Facility: CLINIC | Age: 49
End: 2019-09-05

## 2019-09-05 ENCOUNTER — OFFICE VISIT - HEALTHEAST (OUTPATIENT)
Dept: ADDICTION MEDICINE | Facility: CLINIC | Age: 49
End: 2019-09-05

## 2019-09-05 DIAGNOSIS — F15.20 METHAMPHETAMINE USE DISORDER, SEVERE (H): ICD-10-CM

## 2019-09-05 DIAGNOSIS — F10.20 MODERATE ALCOHOL USE DISORDER (H): ICD-10-CM

## 2019-09-05 LAB
AMPHETAMINES UR QL SCN: ABNORMAL
BARBITURATES UR QL: ABNORMAL
BENZODIAZ UR QL: ABNORMAL
CANNABINOIDS UR QL SCN: ABNORMAL
COCAINE UR QL: ABNORMAL
CREAT UR-MCNC: 44.3 MG/DL
ETHANOL UR CFM-MCNC: <10 MG/DL
METHADONE UR QL SCN: ABNORMAL
OPIATES UR QL SCN: ABNORMAL
OXYCODONE UR QL: ABNORMAL
PCP UR QL SCN: ABNORMAL

## 2019-09-10 ENCOUNTER — AMBULATORY - HEALTHEAST (OUTPATIENT)
Dept: ADDICTION MEDICINE | Facility: CLINIC | Age: 49
End: 2019-09-10

## 2019-09-11 ENCOUNTER — COMMUNICATION - HEALTHEAST (OUTPATIENT)
Dept: ADDICTION MEDICINE | Facility: CLINIC | Age: 49
End: 2019-09-11

## 2021-05-26 NOTE — PROGRESS NOTES
Weekly Progress Note  Gustavo Saleh  1970  [id number]      D) Pt attended 0 groups this week, patient will start group after completing appointment for brief diagnostic assessment, currently scheduled on 3/25/19.  Patient attended 1 individual sessions this week to complete intake. A) Staff facilitated groups and reviewed tx progress. Assessed for [facility]. R) No VAP needed at this time.     Any significant events, defines as events that impact patients relationship with others inside and outside of treatment: No  Indicate any changes or monitoring of physical or mental health problems: No  Indicate involvement by any outside supports: Yes: Patient reports current probation.   IAPP reviewed and modified as needed: NA    Pt working on the following dimensions:  Dimension #1 - Withdrawal Potential - Risk 0. Patient reports last use of methamphetamine and alcohol on 2/19/19. Patient denies recent withdrawal symptoms.  Specific goals from treatment plan addressed this week:  Patient to sign treatment plan on 3/25/19  Effectiveness of strategies:  N/A   Dimension #2 - Biomedical - Risk 0. Patient reports stable health. Patient has [employer] insurance. Patient does not have primary care provider. Patient is able to seek cares as needed  Specific goals from treatment plan addressed this week:  Patient to sign treatment plan on 3/25/19.   Effectiveness of strategies:  N/A  Dimension #3 - Emotional/Behavioral/Cognitive - Risk 2. Patient has concerns about depression, ADHD, OCD, grief, and bi-polar. Patient does not have mental health care provider. Patient reports recently experiencing mental health symptoms. Patient denies suicidal ideation. Patient has suicide attempt in 2004.  Active interventions to stabilize mental health symptoms:  Patient is scheduled for brief DA on 3/25/19.   Specific goals from treatment plan addressed this week:  Patient to sign treatment plan on 3/25/19.   Effectiveness of  strategies:  N/A   Dimension #4 - Readiness for Change - Risk 0. Patient verbalizes a readiness and willingness for change.  Specific goals from treatment plan addressed this week:  Patient to sign treatment plan on 3/25/19.   Effectiveness of strategies:  N/A   Dimension #5 - Relapse Potential - Risk 3. Patient lacks healthy, positive coping skills. Patient lacks skills to prevent relapse. Patient may not recognize impact substance use has on mental health. Patient has had several treatment episodes. Patient has had significant periods of sobriety in the past, mostly while incarcerated.  Specific goals from treatment plan addressed this week:  Patient to sign treatment plan on 3/25/19.   Effectiveness of strategies:  N/A   Dimension #6 - Recovery Environment - Risk 2. Patient is employed. Patient has stable housing. Patient has poor boundaries with using individuals. Patient has support system. Patient is currently on probation with [facility]. Patient has several past legals.  Specific goals from treatment plan addressed this week:  Patient to sign treatment plan on 3/25/19.   Effectiveness of strategies:  N/A   T) Treatment plan updated: yes.  Patient notified and in agreement: Patient to review treatment plan on 3/25/19.   Patient has completed 1 of 90 program hours at this time. Projected discharge date is 6/19/19. Current discharge plan is TBD.     TATIANNA Valera, [facility]  3/22/2019, 3:58 PM       Weekly Educational Topics Date   1. Dual Diagnoses, week 1    2. Dual Diagnoses, week 2    3. Stress Management    4. Feelings/Emotions    5. Thinking    6. Change    7. Recovery Support    8. Relationships/Communication    9. Addiction 101    10. Relapse Prevention    11. Grief and Loss    12. Strengths    13. Wellness    Mindfulness

## 2021-05-27 NOTE — PROGRESS NOTES
Gustavo Saleh Jr. attended 3 hours of group therapy today. Today was patient's first day of group. Patient introduced himself and was welcomed by group.     Total group size of 12.    3/27/2019 4:16 PM Stephy Sanz

## 2021-05-27 NOTE — PROGRESS NOTES
Gustavo Saleh Jr. attended 2 hours of group therapy today.    Total group size of 11.    3/29/2019 2:57 PM Stephy Sanz

## 2021-05-27 NOTE — PROGRESS NOTES
Weekly Progress Note  Gustavo Salhe  1970  [id number]      D) Pt attended 2/5 groups this week with 3 excused absence. No individual sessions were scheduled this week.   A) Staff facilitated groups and reviewed tx progress. Assessed for [facility]. R) No VAP needed at this time.     Any significant events, defines as events that impact patients relationship with others inside and outside of treatment: No  Indicate any changes or monitoring of physical or mental health problems: No  Indicate involvement by any outside supports: Yes: Patient reports current probation. Counselor faxed treatment verification letter to  on 4/9/19.    IAPP reviewed and modified as needed: NA    Pt working on the following dimensions:  Dimension #1 - Withdrawal Potential - Risk 0. Patient reports last use of methamphetamine and alcohol on 2/19/19 at time of intake. Patient reported using meth on 3/28/19.  Patient denies recent withdrawal symptoms.  Specific goals from treatment plan addressed this week:  Patient did not report any use this week. Patient was not requested to complete a UA this week.   Effectiveness of strategies:  Staff to continue to monitor, strategies appear effective.   Dimension #2 - Biomedical - Risk 0. Patient reports stable health. Patient has [employer] insurance. Patient does not have primary care provider. Patient is able to seek cares as needed  Specific goals from treatment plan addressed this week:  Patient reported no changes to physical health this week.   Effectiveness of strategies:  Strategies appear effective.   Dimension #3 - Emotional/Behavioral/Cognitive - Risk 2. Patient has concerns about depression, ADHD, OCD, grief, and bi-polar. Patient does not have mental health care provider. Patient reports recently experiencing mental health symptoms. Patient denies suicidal ideation. Patient has suicide attempt in 2004.  Active interventions to stabilize mental health  symptoms:  Patient attended MICD group.   Specific goals from treatment plan addressed this week:  Patient reported difficulty coping with emotions particularly anger. Patient was encouraged to consider how he can change his reactions to difficulty situations to be more constructive.   Effectiveness of strategies:  Strategies appear effective.   Dimension #4 - Readiness for Change - Risk 0. Patient verbalizes a readiness and willingness for change.  Specific goals from treatment plan addressed this week:  Patient to attended 2/5 groups, communicated with counselor regarding absences.   Effectiveness of strategies:  Strategies appear effective.   Dimension #5 - Relapse Potential - Risk 3. Patient lacks healthy, positive coping skills. Patient lacks skills to prevent relapse. Patient may not recognize impact substance use has on mental health. Patient has had several treatment episodes. Patient has had significant periods of sobriety in the past, mostly while incarcerated. Patient reported to counselor that he had used on 3/28/19, and discussed that he has to get out of his mother's house as being there is a trigger to use.    Specific goals from treatment plan addressed this week:  Patient reported on a voicemail on 4/11/19 that he had used this week, identified stressor of being at his mother's house as trigger. 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   Effectiveness of strategies:  Strategies appear effective.    Dimension #6 - Recovery Environment - Risk 2. Patient is employed. Patient has stable housing. Patient has poor boundaries with using individuals. Patient has support system. Patient is currently on probation with [facility]. Patient has several past legals.  Specific goals from treatment plan addressed this week:  Patient reported that he did not pass the criminal background  check for the sober house that he had moved into and so had to return to his mother's house. Patient left voicemail on 4/12/19 indicating plan to go to a different sober house, but that he would need to stay at his mother's house in between. Patient reported meeting with his PO this week and attending meetings.   Effectiveness of strategies:  Strategies appear effective, counselor to follow up with patient regarding housing.   T) Treatment plan updated: no  Patient notified and in agreement: N/A   Patient has completed 15 of 90 program hours at this time. Projected discharge date is 6/19/19. Current discharge plan is TBD.     TATIANNA Valera, [facility]  4/12/2019, 3:05 PM       Weekly Educational Topics Date   1. Dual Diagnoses, week 1    2. Dual Diagnoses, week 2    3. Stress Management    4. Feelings/Emotions    5. Thinking    6. Change    7. Recovery Support 3/27, 3/29   8. Relationships/Communication 4/1, 4/3   9. Addiction 101 4/9, 4/10   10. Relapse Prevention    11. Grief and Loss    12. Strengths    13. Wellness    Mindfulness  3/29

## 2021-05-27 NOTE — PROGRESS NOTES
Weekly Progress Note  Gustavo Saleh  1970  [id number]      D) Pt attended 2/3 groups this week with 1 excused absence. No individual sessions were scheduled this week.   A) Staff facilitated groups and reviewed tx progress. Assessed for [facility]. R) No VAP needed at this time.     Any significant events, defines as events that impact patients relationship with others inside and outside of treatment: No  Indicate any changes or monitoring of physical or mental health problems: No  Indicate involvement by any outside supports: Yes: Patient reports current probation.   IAPP reviewed and modified as needed: NA    Pt working on the following dimensions:  Dimension #1 - Withdrawal Potential - Risk 0. Patient reports last use of methamphetamine and alcohol on 2/19/19 at time of intake. Patient reported using meth on 3/28/19.  Patient denies recent withdrawal symptoms.  Specific goals from treatment plan addressed this week:  Patient did not report any use this week. Patient was not requested to complete a UA this week.   Effectiveness of strategies:  Staff to continue to monitor, strategies appear effective.   Dimension #2 - Biomedical - Risk 0. Patient reports stable health. Patient has [employer] insurance. Patient does not have primary care provider. Patient is able to seek cares as needed  Specific goals from treatment plan addressed this week:  Patient reported no changes to physical health this week.   Effectiveness of strategies:  Strategies appear effective.   Dimension #3 - Emotional/Behavioral/Cognitive - Risk 2. Patient has concerns about depression, ADHD, OCD, grief, and bi-polar. Patient does not have mental health care provider. Patient reports recently experiencing mental health symptoms. Patient denies suicidal ideation. Patient has suicide attempt in 2004.  Active interventions to stabilize mental health symptoms:  Patient attended MICD group.   Specific goals from treatment plan addressed this  week:  Patient reported that he is working on challenging his thinking, particularly related to meeting other people's expectations.   Effectiveness of strategies:  Strategies appear effective.   Dimension #4 - Readiness for Change - Risk 0. Patient verbalizes a readiness and willingness for change.  Specific goals from treatment plan addressed this week:  Patient to attended 2/3 groups with 1 excused absence. Patient did leave group early both days that he attending this week, counselor to follow up with patient.   Effectiveness of strategies:  Strategies appear effective.   Dimension #5 - Relapse Potential - Risk 3. Patient lacks healthy, positive coping skills. Patient lacks skills to prevent relapse. Patient may not recognize impact substance use has on mental health. Patient has had several treatment episodes. Patient has had significant periods of sobriety in the past, mostly while incarcerated. Patient reported to counselor that he had used on 3/28/19, and discussed that he has to get out of his mother's house as being there is a trigger to use.    Specific goals from treatment plan addressed this week:  Patient reported no use this week, and reported a few urges. Patient identified some of his urges were for criminal behavior.   Effectiveness of strategies:  Strategies appear effective.    Dimension #6 - Recovery Environment - Risk 2. Patient is employed. Patient has stable housing. Patient has poor boundaries with using individuals. Patient has support system. Patient is currently on probation with [facility]. Patient has several past legals.  Specific goals from treatment plan addressed this week:  Patient reported he is moving of out his mother's house into a sober house. 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   Effectiveness of strategies:  Strategies appear effective.   T) Treatment plan updated: no   Patient notified and in agreement: N/A   Patient has completed 11 of 90 program hours at this time. Projected discharge date is 6/19/19. Current discharge plan is TBD.     TATIANNA Valera, [facility]  4/5/2019, 8:37 AM       Weekly Educational Topics Date   1. Dual Diagnoses, week 1    2. Dual Diagnoses, week 2    3. Stress Management    4. Feelings/Emotions    5. Thinking    6. Change    7. Recovery Support 3/27, 3/29   8. Relationships/Communication 4/1, 4/3   9. Addiction 101    10. Relapse Prevention    11. Grief and Loss    12. Strengths    13. Wellness    Mindfulness  3/29

## 2021-05-27 NOTE — PROGRESS NOTES
Weekly Progress Note  Gustavo Saleh  1970  [id number]      D) Pt attended 2/3 groups this week with 1 excused absence. Patient attended 1 individual sessions this week to complete brief diagnostic assessment. A) Staff facilitated groups and reviewed tx progress. Assessed for [facility]. R) No VAP needed at this time.     Any significant events, defines as events that impact patients relationship with others inside and outside of treatment: No  Indicate any changes or monitoring of physical or mental health problems: No  Indicate involvement by any outside supports: Yes: Patient reports current probation.   IAPP reviewed and modified as needed: NA    Pt working on the following dimensions:  Dimension #1 - Withdrawal Potential - Risk 0. Patient reports last use of methamphetamine and alcohol on 2/19/19. Patient denies recent withdrawal symptoms.  Specific goals from treatment plan addressed this week:  Patient reported using meth on 3/28/19. Patient was not requested to complete a UA this week.   Effectiveness of strategies:  Staff to follow up with patient regarding use.   Dimension #2 - Biomedical - Risk 0. Patient reports stable health. Patient has [employer] insurance. Patient does not have primary care provider. Patient is able to seek cares as needed  Specific goals from treatment plan addressed this week:  Patient reported no changes to physical health this week. Patient was encouraged by counselor to schedule an appointment with a primary care physician.    Effectiveness of strategies:  Strategies appear effective.   Dimension #3 - Emotional/Behavioral/Cognitive - Risk 2. Patient has concerns about depression, ADHD, OCD, grief, and bi-polar. Patient does not have mental health care provider. Patient reports recently experiencing mental health symptoms. Patient denies suicidal ideation. Patient has suicide attempt in 2004.  Active interventions to stabilize mental health symptoms:  Patient met with  counselor to complete brief DA on 3/25/19.   Specific goals from treatment plan addressed this week:  Patient discussed some of his reluctance about potentially taking medications to address mental health, but was receptive to feedback about how it may be helpful for him.   Effectiveness of strategies:  Strategies appear effective.   Dimension #4 - Readiness for Change - Risk 0. Patient verbalizes a readiness and willingness for change.  Specific goals from treatment plan addressed this week:  Patient to attended 2/3 groups with 1 excused absence, and attended scheduled 1:1.   Effectiveness of strategies:  N/A   Dimension #5 - Relapse Potential - Risk 3. Patient lacks healthy, positive coping skills. Patient lacks skills to prevent relapse. Patient may not recognize impact substance use has on mental health. Patient has had several treatment episodes. Patient has had significant periods of sobriety in the past, mostly while incarcerated.  Specific goals from treatment plan addressed this week:  Patient reported to counselor that he had used on 3/28/19, and discussed that he has to get out of his mother's house as being there is a trigger to use.    Effectiveness of strategies:  Counselor to follow up with patient regarding use.   Dimension #6 - Recovery Environment - Risk 2. Patient is employed. Patient has stable housing. Patient has poor boundaries with using individuals. Patient has support system. Patient is currently on probation with [facility]. Patient has several past legals.  Specific goals from treatment plan addressed this week:  Patient reported plan to follow up with a sober house in [place] on 3/29/19 and that he hoped to move in over the weekend. 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   Effectiveness of strategies:  Strategies appear effective.   T) Treatment plan updated: no  Patient notified and in agreement: Patient signed plan on  3/25/19.   Patient has completed 7 of 90 program hours at this time. Projected discharge date is 6/19/19. Current discharge plan is TBD.     TATIANNA Valera, [facility]  3/29/2019, 5:00 PM       Weekly Educational Topics Date   1. Dual Diagnoses, week 1    2. Dual Diagnoses, week 2    3. Stress Management    4. Feelings/Emotions    5. Thinking    6. Change    7. Recovery Support 3/27, 3/29   8. Relationships/Communication    9. Addiction 101    10. Relapse Prevention    11. Grief and Loss    12. Strengths    13. Wellness    Mindfulness  3/29            no

## 2021-05-27 NOTE — PROGRESS NOTES
Gustavo Saleh Jr. attended 2 hours of group therapy today. Patient left early and did not indicate to stay reason for leaving.     Total group size of 9.    4/1/2019 3:23 PM Stephy Sanz

## 2021-05-27 NOTE — PROGRESS NOTES
Gustavo Saleh Jr. attended 3 hours of group therapy today.    Total group size of 12.    4/17/2019 1:44 PM Stephy Sanz

## 2021-05-27 NOTE — PROGRESS NOTES
Gustavo Saleh Jr. attended 2 hours of group therapy today. Patient left early without informing staff.     Total group size of 7.    4/9/2019 12:06 PM Stephy Sanz

## 2021-05-27 NOTE — PROGRESS NOTES
Gustavo Saleh Jr. attended 2 hours of group therapy today.    Total group size of 11.    4/3/2019 2:13 PM Stephy Sanz

## 2021-05-27 NOTE — PROGRESS NOTES
Gustavo Saleh Jr. attended 2 hours of group therapy today.    Total group size of 9.    4/10/2019 4:09 PM Stephy Sanz

## 2021-05-28 NOTE — PROGRESS NOTES
Gustavo Saleh Jr. attended 1 hours of group therapy today. Patient reported significant hip pain and that he was going to ED to get check out.     Total group size of 11.    5/13/2019 12:15 PM Stephy Sanz

## 2021-05-28 NOTE — PROGRESS NOTES
Gustavo Saleh Jr. attended 1 hours of group therapy today.    Total group size of 11.    5/3/2019 1:54 PM Stephy Sanz

## 2021-05-28 NOTE — PROGRESS NOTES
Gustavo Saleh Jr. attended 1 hours of group therapy today. Patient reported that his mother's health was failing and that he had used this morning. Patient reported plan to meet with his sponsor after group. Patient was informed that he cannot be present in group when high.     Total group size of 14.    4/23/2019 12:17 PM Stephy Sanz

## 2021-05-28 NOTE — PROGRESS NOTES
Gustavo Saleh Jr. attended 3 hours of group therapy today.    Total group size of 7.    4/22/2019 4:11 PM Stephy Sanz
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
## 2021-05-28 NOTE — PROGRESS NOTES
Gustavo Saleh Jr. attended 1 hours of group therapy today.    Total group size of 12.    5/8/2019 1:35 PM Stephy Sanz

## 2021-05-28 NOTE — PROGRESS NOTES
Gustavo Saleh Jr. attended 2 hours of group therapy today.    Total group size of 10.    4/26/2019 2:17 PM Stephy Sanz

## 2021-05-28 NOTE — PROGRESS NOTES
Weekly Progress Note  Gustavo Saleh  1970  [id number]      D) Pt attended 3/4 groups this week with 1 excused absence. Patient attended 1 individual session this week on 4/22/19  A) Staff facilitated groups and reviewed tx progress. Assessed for [facility]. R) No VAP needed at this time.     Any significant events, defines as events that impact patients relationship with others inside and outside of treatment: Patient reported that his mother had a significant fall over the weekend and was in the hospital.   Indicate any changes or monitoring of physical or mental health problems: No  Indicate involvement by any outside supports: Yes: Patient reports current probation.   IAPP reviewed and modified as needed: NA    Pt working on the following dimensions:  Dimension #1 - Withdrawal Potential - Risk 0. Patient reports last use of methamphetamine and alcohol on 2/19/19 at time of intake. Patient has had multiple reported relapse while in group.  Patient denies recent withdrawal symptoms.  Specific goals from treatment plan addressed this week:  Patient reported using on 4/23/19, and was informed that he cannot attend group high. Patient was not requested to complete a UA this week.   Effectiveness of strategies:  Staff to continue to monitor, patient may need to be referred to a higher level of care.   Dimension #2 - Biomedical - Risk 0. Patient reports stable health. Patient has [employer] insurance. Patient does not have primary care provider. Patient is able to seek cares as needed  Specific goals from treatment plan addressed this week:  Patient reported no changes to physical health this week.   Effectiveness of strategies:  Strategies appear effective.   Dimension #3 - Emotional/Behavioral/Cognitive - Risk 2. Patient has concerns about depression, ADHD, OCD, grief, and bi-polar. Patient does not have mental health care provider. Patient reports recently experiencing mental health symptoms. Patient denies  suicidal ideation. Patient has suicide attempt in 2004.  Active interventions to stabilize mental health symptoms:  Patient attended MICD group.   Specific goals from treatment plan addressed this week:  Patient presented with significant emotional dysregulation related to his mother's health. Patient processed these concerns in group, discussed how he is attempting to apply mindfulness concepts.   Effectiveness of strategies:  Strategies appear effective.   Dimension #4 - Readiness for Change - Risk 0. Patient verbalizes a readiness and willingness for change.  Specific goals from treatment plan addressed this week:  Patient to attended 3/4 groups, communicated about absences.    Effectiveness of strategies:  Strategies appear effective.   Dimension #5 - Relapse Potential - Risk 3. Patient lacks healthy, positive coping skills. Patient lacks skills to prevent relapse. Patient may not recognize impact substance use has on mental health. Patient has had several treatment episodes. Patient has had significant periods of sobriety in the past, mostly while incarcerated. Patient has had four reported relapse while in treatment.   Specific goals from treatment plan addressed this week:  Patient reported using on 4/24/19, did not report any other use this week.   Effectiveness of strategies:  Patient may benefit from a referral to a higher level of care, staff to monitor if patient is able to maintain abstinence while in outpatient.   Dimension #6 - Recovery Environment - Risk 2. Patient is employed. Patient has stable housing. Patient has poor boundaries with using individuals. Patient has support system. Patient is currently on probation with [facility]. Patient has several past legals.  Specific goals from treatment plan addressed this week:  Patient reported continuing to meet frequently with sponsor, and that he is getting to know people in his sober house. Patient reported much of his time at the hospital with his  mother.   Effectiveness of strategies:  Strategies appear effective.    T) Treatment plan updated: no  Patient notified and in agreement: N/A   Patient educated on Grief and Loss. Patient has completed 25 of 90 program hours at this time. Projected discharge date is 6/19/19. Current discharge plan is TBD.     TATIANNA Valera, [facility]  4/26/2019, 3:30 PM       Weekly Educational Topics Date   1. Dual Diagnoses, week 1    2. Dual Diagnoses, week 2    3. Stress Management    4. Feelings/Emotions    5. Thinking    6. Change    7. Recovery Support 3/27, 3/29   8. Relationships/Communication 4/1, 4/3   9. Addiction 101 4/9, 4/10   10. Relapse Prevention 4/17   11. Grief and Loss 4/22, 4/23, 4/26   12. Strengths    13. Wellness    Mindfulness  3/29, 4/26

## 2021-05-28 NOTE — PROGRESS NOTES
LATE ENTRY FOR WEEK OF 4/15/19--4/19/19    Weekly Progress Note  Gustavo Saleh  1970  [id number]      D) Pt attended 1/4 groups this week with 3 excused absence. No individual sessions were scheduled this week.   A) Staff facilitated groups and reviewed tx progress. Assessed for [facility]. R) No VAP needed at this time.     Any significant events, defines as events that impact patients relationship with others inside and outside of treatment: No  Indicate any changes or monitoring of physical or mental health problems: No  Indicate involvement by any outside supports: Yes: Patient reports current probation.   IAPP reviewed and modified as needed: NA    Pt working on the following dimensions:  Dimension #1 - Withdrawal Potential - Risk 0. Patient reports last use of methamphetamine and alcohol on 2/19/19 at time of intake. Patient reported using meth on 3/28/19.  Patient denies recent withdrawal symptoms.  Specific goals from treatment plan addressed this week:  Patient did not report using over the previous weekend, third reported relapse while in group. Patient was not requested to complete a UA this week.   Effectiveness of strategies:  Staff to continue to monitor, strategies appear effective.   Dimension #2 - Biomedical - Risk 0. Patient reports stable health. Patient has [employer] insurance. Patient does not have primary care provider. Patient is able to seek cares as needed  Specific goals from treatment plan addressed this week:  Patient reported no changes to physical health this week.   Effectiveness of strategies:  Strategies appear effective.   Dimension #3 - Emotional/Behavioral/Cognitive - Risk 2. Patient has concerns about depression, ADHD, OCD, grief, and bi-polar. Patient does not have mental health care provider. Patient reports recently experiencing mental health symptoms. Patient denies suicidal ideation. Patient has suicide attempt in 2004.  Active interventions to stabilize mental health  symptoms:  Patient attended MICD group.   Specific goals from treatment plan addressed this week:  Patient reported feeling overwhelm, particularly related to his mother's health, reported that he has been attempting to meditate and use mindfulness skills.   Effectiveness of strategies:  Strategies appear effective.   Dimension #4 - Readiness for Change - Risk 0. Patient verbalizes a readiness and willingness for change.  Specific goals from treatment plan addressed this week:  Patient to attended 1/4 group, communicated about absences.   Effectiveness of strategies:  Strategies appear effective.   Dimension #5 - Relapse Potential - Risk 3. Patient lacks healthy, positive coping skills. Patient lacks skills to prevent relapse. Patient may not recognize impact substance use has on mental health. Patient has had several treatment episodes. Patient has had significant periods of sobriety in the past, mostly while incarcerated. Patient has had three reported relapse while in treatment.   Specific goals from treatment plan addressed this week:  Patient reported using over the weekend, and has since moved into a sober house. Patient identified that he is unable to live with his mother and stay sober.    Effectiveness of strategies:  Strategies appear effective.    Dimension #6 - Recovery Environment - Risk 2. Patient is employed. Patient has stable housing. Patient has poor boundaries with using individuals. Patient has support system. Patient is currently on probation with [facility]. Patient has several past legals.  Specific goals from treatment plan addressed this week:  Patient reported some stress related to moving into the sober house, but feels it is where he needs to be. Patient reports current recovery group attendance and meeting with his sponsor.   Effectiveness of strategies:  Strategies appear effective.    T) Treatment plan updated: to be updated based on new risk ratings  Patient notified and in  agreement: N/A   Patient has completed 18 of 90 program hours at this time. Projected discharge date is 6/19/19. Current discharge plan is TBD.     TATIANNA Valera, [facility]  4/22/2019, 5:08 PM       Weekly Educational Topics Date   1. Dual Diagnoses, week 1    2. Dual Diagnoses, week 2    3. Stress Management    4. Feelings/Emotions    5. Thinking    6. Change    7. Recovery Support 3/27, 3/29   8. Relationships/Communication 4/1, 4/3   9. Addiction 101 4/9, 4/10   10. Relapse Prevention 4/17   11. Grief and Loss    12. Strengths    13. Wellness    Mindfulness  3/29
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
## 2021-05-28 NOTE — PROGRESS NOTES
Gustavo Saleh Jr. attended 3 hours of group therapy today.    Total group size of 12.    4/30/2019 12:15 PM Stephy Sanz

## 2021-05-28 NOTE — PROGRESS NOTES
Gustavo Saleh Jr. attended 3 hours of group therapy today.    Total group size of 10.    5/17/2019 12:18 PM Stephy Sanz

## 2021-05-28 NOTE — PROGRESS NOTES
Individual Treatment Plan    Patient  Name: Gustavo Saleh Jr.  MRN: [id number]   : 1970  Admit Date: 3/19/19  Date of Initial Service Plan: 3/19/19  Tentative Discharge Date: 19    Diagnosis: Stimulant Use Disorder, Severe, (F15.20) (304.40) Amphetamine type substance and Alcohol Use Disorder, Moderate, (F10.20) (303.90).    Counselor: TATIANNA Valera, [facility]      Dimension 1: Acute Intoxication/Withdrawal Potential, Risk level: 0    Problem Statement from Comprehensive Assessment: on 3/19/19:  Patient reports last use of methamphetamine and alcohol on 19. Patient denies recent withdrawal symptoms.    Problem: Methamphetamine and Alcohol Use Disorder  Goal: Patient to maintain abstinence throughout outpatient treatment.   Must be reached to complete treatment? Yes  Methods/Strategies (must include amount and frequency):   1. Patient to report any substance/alcohol use to counselor to determine if any changes need to be made to address withdrawal symptoms.   2. Patient to complete any requested UAs. Any other missed UAs may result in discharge from program.  Target Date: 19  Completion Date:       Dimension 2: Biomedical Conditions/Complications, Risk level: 0    Problem Statement from Comprehensive Assessment: on 3/19/19  Patient reports stable health. Patient has [employer] insurance. Patient does not have primary care provider. Patient is able to seek cares as needed    Problem: Stable health.  Goal: Patient to maintain stable health throughout outpatient treatment.   Must be reached to complete treatment? No  Methods/Strategies (must include amount and frequency):   1. Patient to report any changes to physical health to counselor.   2. Patient to attend all scheduled doctor s appointments.   3. Patient to take medications as prescribed.   Target Date: 19  Completion Date:     Problem: Patient reports current tobacco use.   Goal: Patient to receive information about  smoking cessation.   Must be reached to complete treatment? No  Methods/Strategies (must include amount and frequency):   1. Staff to provide patient with nicotine cessation information and help on how to quit use.   2. Patient to report any progress on stopping nicotine use to staff.   Target Date: 06/28/19  Completion Date:       Dimension 3: Emotional/Behavioral/Cognitive, Risk level: 2    Problem Statement from Comprehensive Assessment: on 3/19/19:  Patient has concerns about depression, ADHD, OCD, grief, and bi-polar. Patient does not have mental health care provider. Patient reports recently experiencing mental health symptoms. Patient denies suicidal ideation. Patient has suicide attempt in 2004.    Problem: Self-reports depression, ADHD, OCD, grief, and bi-polar.  Goal: Stable mental health.  Must be reached to complete treatment? No  Methods/Strategies (must include amount and frequency):   1. Patient to begin using coping skills learned in therapeutic group (such as grounding, breathing, thought challenging, mindfulness, etc), and share in daily check-in any benefits or challenges that you experience using these skills.  2. Patient to discuss mental health referrals with primary counselor.   Target Date: 06/28/19  Completion Date:       Dimension 4: Readiness to Change, Risk level 0    Problem Statement from Comprehensive Assessment: on 3/19/19:  Patient verbalizes a readiness and willingness for change.    Problem: Ongoing motivation  Goal: Patient to increase motivation towards recovery by participating in outpatient programming.   Must be reached to complete treatment? Yes  Methods/Strategies (must include amount and frequency):   1. Patient to attend 4, 3-hour groups per week and all scheduled 1:1s.  2. Patient will contact staff if unable to attend   Target Date: 06/17/19  Completion Date: 5/3/19  1. Patient to attend 3, 3-hour groups per week and all scheduled 1:1s.  2. Patient will contact staff if  unable to attend (Stephy [phone])  Target Date: 06/28/19  Completion Date:       Dimension 5: Relapse/Continued Use/Continued Problem Potential, Risk level: 3    Problem Statement from Comprehensive Assessment: on 3/19/19:  Patient lacks healthy, positive coping skills. Patient lacks skills to prevent relapse. Patient may not recognize impact substance use has on mental health. Patient has had several treatment episodes. Patient has had significant periods of sobriety in the past, mostly while incarcerated.    Problem: Continued use.  Goal: Develop relapse prevention skills  Must be reached to complete treatment? Yes  Methods/Strategies (must include amount and frequency):   1. Patient to share in daily check-in any urges and addictive thinking to better understand his pattern of use and to prevent relapse in the future.   2. Patient to identify 3 warning signs of a relapse and identify at least 3 coping skills to address this.   3. Patient to identify what needs to changes since the last time in treatment to run an effective recovery program.   Target Date: 06/28/19  Completion Date:       Dimension 6: Recovery Environment, Risk level: 2    Problem Statement from Comprehensive Assessment: on 3/19/19:  Patient is employed. Patient has stable housing. Patient has poor boundaries with using individuals. Patient has support system. Patient is currently on probation with [facility]. Patient has several past legals.    Problem: [facility] Probation.  Goal: Complete probation.  Must be reached to complete treatment? No  Methods/Strategies (must include amount and frequency):   1. Patient to attend all meetings with .   2. Patient to follow recommendations.   Target Date: 06/28/19  Completion Date:     Problem: Patient reports poor boundaries.   Goal: Patient to set boundaries and build on current support system.   Must be reached to completed treatment? Yes  Methods/Strategies (must include  amount and frequency):   1. Patient to attend recovery support groups and meet with sponsor. Share with counselor/group any benefits or challenges.   2. Patient to identify how lack of boundaries with his family has kept him stuck and identify ways to maintain boundaries. Share with counselor.   Target Date: 6/28/19  Completion Date:         Resources  Resources to which the patient is being referred for problems when problems are to be addressed concurrently by another provider: No current referrals, patient to be referred for mental health.         By signing this document, I am acknowledging that I was actively and directly involved in the development of my treatment plan.         Patient  Signature_________________________________________         Date__________________        Staff Signature  TATIANNA Valera, [facility]    Date: 5/14/2019, 4:33 PM

## 2021-05-28 NOTE — PROGRESS NOTES
LATE ENTRY FOR 4/22/19    D. Counselor met with patient for 40 minutes to discuss treatment progress. A. Counselor discussed ways that the patient can be more open to challenges in his living environment, and encouraged patient to continue working with his sponsor. Counselor encouraged patient to try to be present for his mother and to not tie his recovery to her life. Counselor discussed that patient may benefit from ADHD testing, discussed referral. R. Patient reported some concerns about other's differences at the sober house, but that he is trying to be open minded. Patient reported he is meeting with his sponsor twice weekly, and that his sponsor is encouraging him to get rid of things that he obtained illegally. Patient reported that he desires to be sober to honor his mother, but reports understanding feedback of the risk of making a promise to his mother that he will never use again. Patient reported he would be open to the referral for ADHD testing. CAITLIN. Counselor to update treatment plan based on 1:1.       TATIANNA Valera, [facility]  4/23/2019, 5:52 PM

## 2021-05-29 NOTE — PROGRESS NOTES
Gustavo Saleh Jr. attended 3 hours of group therapy today.    Total group size of 10.    6/19/2019 12:24 PM Stephy Sanz

## 2021-05-29 NOTE — PROGRESS NOTES
Gustavo Saleh Jr. attended 3 hours of group therapy today. Due to patient's diluted UA on 6/21/19, counselor requested that patient complete a supervised UA in the clinic. Patient reported willingness to complete the UA, and then admitted that he has been using and that he intentionally diluted the UA. Counselor met briefly with patient after group, and discussed that per the attendance and participation agreement and due to patient's repeated relapse, outpatient is no longer the appropriate level of care and that it is the counselor recommendation that the patient be referred to a higher level of care. Patient expressed distress at this recommendation and discussed how this would impact his mother's care and other situations that he has set up. Patient requested that the counselor reconsider, counselor reported willingness to staff this with supervisor. Counselor met with supervisor and discuss this situation, supervisor affirmed recommendation to a higher level of care.     Total group size of 10.    6/24/2019 3:42 PM Stephy Sanz

## 2021-05-29 NOTE — PROGRESS NOTES
Gustavo Saleh Jr. attended 2 hours of group therapy today.    Total group size of 9.    5/29/2019 12:04 PM Stephy Sanz

## 2021-05-29 NOTE — PROGRESS NOTES
Weekly Progress Note  Gustavo Saleh  1970  [id number]      D) Pt attended ZERO groups  this week with 2 excused absences. A) Staff facilitated groups and reviewed tx progress. Assessed for VA. R) Unable to assess along six dimensions or for VA due to lack of attendance.   T)  Patient has completed 34 of 90 program hours at this time. Projected discharge date is 6/28/19 Current discharge plan is TBD.     TATIANNA Valera, [facility]  5/23/2019, 4:37 PM       Weekly Educational Topics Date   1. Dual Diagnoses, week 1 5/13, 5/17   2. Dual Diagnoses, week 2    3. Stress Management    4. Feelings/Emotions    5. Thinking    6. Change    7. Recovery Support 3/27, 3/29   8. Relationships/Communication 4/1, 4/3   9. Addiction 101 4/9, 4/10   10. Relapse Prevention 4/17   11. Grief and Loss 4/22, 4/23, 4/26   12. Strengths 4/30, 5/3   13. Wellness 5/8   Mindfulness  3/29, 4/26, 5/3

## 2021-05-29 NOTE — PROGRESS NOTES
Gustavo Saleh Jr. attended 3 hours of group therapy today.    Total group size of 10.    6/12/2019 1:49 PM Stephy Sanz

## 2021-05-29 NOTE — PROGRESS NOTES
MICD Attendance and Expectation Agreement    I, Gustavo HECTOR Saleh Jr., agree to following:      I will call by 5pm on the day of any absences. If I do not call by 5pm this absence is unexcused and may result in discharge from the program.     If I am out sick for 2+ days, I will provide a doctor s note to give to my counselor upon return to group.     I will arrive to group on time and inform counselor of any circumstances that may prevent me from getting to group on time or staying for all of group.     I will schedule any and all appointments outside of group times. If there is an unavoidable conflict, I will discuss this with my counselor.     I will complete all homework on time and maintain engagement and participation in group.      I will not use my cell phone in group and will excuse myself if I need to take a call or reply back to a text message.     I will not leave group until break time, unless it is absolutely necessary.     I will maintain abstinence throughout my participation. I understand that in I engage in substance use I will be recommended to a higher level of care.      If I am unable to comply with these expectations, I understand that this behavior may result in a discharge from the MICD OP program.      Patient Signature: _____________________________  Date: _____________          Staff Signature:  TATIANNA Valera, [facility]              Date: 5/31/2019, 8:33 AM

## 2021-05-29 NOTE — PROGRESS NOTES
Gustavo Saleh Jr. attended 1 hours of group therapy today.  Ct left group at first break and did not come back.  Writer found him out by the 'smoker' and asked why he did not come in and he stated that he was on the phone with his PO.  Ct was encouraged to come back to group and he did not.  Total group size of 9.    6/3/2019 1:32 PM Cheyanne Miller

## 2021-05-29 NOTE — PROGRESS NOTES
Weekly Progress Note  Gustavo Saleh  1970  [id number]      D) Pt attended 2/3 groups this week with 1 excused absence. No individual sessions were scheduled this week.   A) Staff facilitated groups and reviewed tx progress. Assessed for VA. R) No VAP needed at this time.     Any significant events, defines as events that impact patients relationship with others inside and outside of treatment: None reported this week.   Indicate any changes or monitoring of physical or mental health problems: No changes.   Indicate involvement by any outside supports: Yes: Patient reports current probation. Counselor left voicemail for patient's PO of intent to discharge due to missed UA on 6/21/19.   IAPP reviewed and modified as needed: NA    Pt working on the following dimensions:  Dimension #1 - Withdrawal Potential - Risk 0. Patient reports last use of methamphetamine and alcohol on 2/19/19 at time of intake. Patient has had multiple reported relapse while in group. Patient reported using on 4/23/19, and was informed that he cannot attend group high.  Patient was requested to complete UA on 5/17/19, which was positive for amphetamines. Patient was requested to complete a UA on 5/29/19 which was negative for all tested substances. Patient was requested to complete a UA on 6/14/19, which was negative for all tested substances. Patient denies recent withdrawal symptoms.  Specific goals from treatment plan addressed this week:  Patient did not report any substance use this week. Patient ws requested to complete a UA on 6/21/19 and was reminded that a missed UA would be considered a positive. Patient's chart indicated that the patient had not taken the UA prior to 1:30pm, thus resulting in intent to discharge. Patient contact counselor that afternoon, reporting that he would come back to the hospital to complete the UA. Patient's chart indicate the UA was completed on 6/21/19, but results were diluted.   Effectiveness of  strategies:  Counselor to staff concerns with program staff, current recommendation that patient complete a supervised UA in clinic to remain in programming.   Dimension #2 - Biomedical - Risk 0. Patient reports stable health. Patient has [employer] insurance. Patient does not have primary care provider. Patient is able to seek cares as needed  Specific goals from treatment plan addressed this week:  Patient reported no significant changes to physical health this week.    Effectiveness of strategies:  Strategies appear effective.  Dimension #3 - Emotional/Behavioral/Cognitive - Risk 2. Patient has concerns about depression, ADHD, OCD, grief, and bi-polar. Patient does not have mental health care provider. Patient reports recently experiencing mental health symptoms. Patient denies suicidal ideation. Patient has suicide attempt in 2004.  Active interventions to stabilize mental health symptoms:  Patient attended MICD group.   Specific goals from treatment plan addressed this week:  Patient discussed that he will frequently get caught up in how he hasn't achieved his goals in the past or been who he thought he should be.   Effectiveness of strategies:  Strategies appear effective.   Dimension #4 - Readiness for Change - Risk 0. Patient verbalizes a readiness and willingness for change.  Specific goals from treatment plan addressed this week:  Patient to attended 2/3 groups, communicated regarding absences.   Effectiveness of strategies:  Strategies appear effective.   Dimension #5 - Relapse Potential - Risk 3. Patient lacks healthy, positive coping skills. Patient lacks skills to prevent relapse. Patient may not recognize impact substance use has on mental health. Patient has had several treatment episodes. Patient has had significant periods of sobriety in the past, mostly while incarcerated. Patient has had five reported relapses while in treatment.   Specific goals from treatment plan addressed this  week:  Patient reported no use and minimal urges.   Effectiveness of strategies:  Strategies appear effective.   Dimension #6 - Recovery Environment - Risk 2. Patient is employed. Patient has stable housing. Patient has poor boundaries with using individuals. Patient has support system. Patient is currently on probation with [facility]. Patient has several past legals.  Specific goals from treatment plan addressed this week:  Patient reports continuing to try to sort through his mother's health concerns. Patient reports staying in contact with sponsor.   Effectiveness of strategies:  Strategies appear effective.    T) Treatment plan updated: no  Patient notified and in agreement: N/A   Patient educated on Change. Patient has completed 54 of 90 program hours at this time. Projected discharge date is 6/28/19 Current discharge plan is TBD.     TATIANNA Valera, [facility]  6/22/2019, 1:09 PM       Weekly Educational Topics Date   1. Dual Diagnoses, week 1 5/13, 5/17   2. Dual Diagnoses, week 2 5/24   3. Stress Management 5/29   4. Feelings/Emotions 6/3, 6/7   5. Thinking 6/10, 6/12, 6/14   6. Change 6/19, 6/21   7. Recovery Support 3/27, 3/29   8. Relationships/Communication 4/1, 4/3   9. Addiction 101 4/9, 4/10   10. Relapse Prevention 4/17   11. Grief and Loss 4/22, 4/23, 4/26   12. Strengths 4/30, 5/3   13. Wellness 5/8   Mindfulness  3/29, 4/26, 5/3, 6/7, 6/14, 6/21

## 2021-05-29 NOTE — PROGRESS NOTES
Gustavo Saleh Jr. attended 2 hours of group therapy today. Patient reported feeling frustrated at counselors after 2 hour and stated that he was leaving, primary counselor attempted to follow up with patient via phone.     Total group size of 10.    6/14/2019 2:09 PM Stephy Sanz

## 2021-05-29 NOTE — PROGRESS NOTES
Weekly Progress Note  Gustavo Saleh  1970  [id number]      D) Pt attended 2/3 groups this week with 1 unexcused absence. No individual sessions were scheduled this week.   A) Staff facilitated groups and reviewed tx progress. Assessed for [facility]. R) No VAP needed at this time.     Any significant events, defines as events that impact patients relationship with others inside and outside of treatment: None reported this week.   Indicate any changes or monitoring of physical or mental health problems: Patient reported having should scope and reported significant pain.   Indicate involvement by any outside supports: Yes: Patient reports current probation.   IAPP reviewed and modified as needed: NA    Pt working on the following dimensions:  Dimension #1 - Withdrawal Potential - Risk 0. Patient reports last use of methamphetamine and alcohol on 2/19/19 at time of intake. Patient has had multiple reported relapse while in group. Patient reported using on 4/23/19, and was informed that he cannot attend group high.  Patient was requested to complete UA on 5/17/19, which was positive for amphetamines. Patient denies recent withdrawal symptoms.  Specific goals from treatment plan addressed this week:  Patient did not report any substance use this week. Patient was requested to complete a UA on 5/29/19 which was negative for all tested substances. Patient was informed that any additional use would result in a referral to a higher level of care.   Effectiveness of strategies:  Strategies appear effective for now.   Dimension #2 - Biomedical - Risk 0. Patient reports stable health. Patient has [employer] insurance. Patient does not have primary care provider. Patient is able to seek cares as needed  Specific goals from treatment plan addressed this week:  Patient reported shoulder scope last week and that he is still taking medications follow this. Patient reports likelihood of additional surgery. Patient reported  needing to attend doctor and dental appointments.   Effectiveness of strategies:  Strategies appear effective, staff to continue to monitor.   Dimension #3 - Emotional/Behavioral/Cognitive - Risk 2. Patient has concerns about depression, ADHD, OCD, grief, and bi-polar. Patient does not have mental health care provider. Patient reports recently experiencing mental health symptoms. Patient denies suicidal ideation. Patient has suicide attempt in 2004.  Active interventions to stabilize mental health symptoms:  Patient attended MICD group.   Specific goals from treatment plan addressed this week:  Patient reported difficulty coping with his emotions, particularly related to his physical health. Patient appear receptive to information related to changing attitudes related to stress.   Effectiveness of strategies:  Strategies appear effective.   Dimension #4 - Readiness for Change - Risk 0. Patient verbalizes a readiness and willingness for change.  Specific goals from treatment plan addressed this week:  Patient to attended 2/3 groups with 1 unexcused absence. Patient also missed 1:1 appointment on 5/28/19. Patient will be informed that any additional absences will result in discharge. .   Effectiveness of strategies:  Strategies appear effective.   Dimension #5 - Relapse Potential - Risk 3. Patient lacks healthy, positive coping skills. Patient lacks skills to prevent relapse. Patient may not recognize impact substance use has on mental health. Patient has had several treatment episodes. Patient has had significant periods of sobriety in the past, mostly while incarcerated. Patient has had five reported relapse while in treatment.   Specific goals from treatment plan addressed this week:  Patient has been informed that any additional use will result in referral to a higher level of care. Patient reports some urges this week, but no use. Patient reports he has been reaching out others.   Effectiveness of  strategies:  Counselor to follow up with patient, patient likely will need to referred to a higher level of care.   Dimension #6 - Recovery Environment - Risk 2. Patient is employed. Patient has stable housing. Patient has poor boundaries with using individuals. Patient has support system. Patient is currently on probation with [facility]. Patient has several past legals.  Specific goals from treatment plan addressed this week:  Patient reported he has been talking to his sponsor, and reaching out to other supportive people. Patient reported that he is looking into moving with a couple sober friends, but has some concerns related to one of the people's recent relapse.   Effectiveness of strategies:  Strategies appear effective.    T) Treatment plan updated: no  Patient notified and in agreement: N/A  Patient educated on Stress Management. Patient has completed 37 of 90 program hours at this time. Projected discharge date is 6/28/19 Current discharge plan is TBD.     TATIANNA Valera, [facility]  5/31/2019, 2:19 PM       Weekly Educational Topics Date   1. Dual Diagnoses, week 1 5/13, 5/17   2. Dual Diagnoses, week 2 5/24   3. Stress Management 5/29   4. Feelings/Emotions    5. Thinking    6. Change    7. Recovery Support 3/27, 3/29   8. Relationships/Communication 4/1, 4/3   9. Addiction 101 4/9, 4/10   10. Relapse Prevention 4/17   11. Grief and Loss 4/22, 4/23, 4/26   12. Strengths 4/30, 5/3   13. Wellness 5/8   Mindfulness  3/29, 4/26, 5/3

## 2021-05-29 NOTE — PROGRESS NOTES
Gustavo Saleh Jr. attended 2 hours of group therapy today. Patient reported needing to leave group early due to mother's appointment.     Total group size of 8.    6/21/2019 12:17 PM Stephy Sanz

## 2021-05-29 NOTE — PROGRESS NOTES
Weekly Progress Note  Gustavo Saleh  1970  [id number]      D) Pt attended 3/3 groups this week with no absences. No individual sessions were scheduled this week.   A) Staff facilitated groups and reviewed tx progress. Assessed for [facility]. R) No VAP needed at this time.     Any significant events, defines as events that impact patients relationship with others inside and outside of treatment: None reported this week.   Indicate any changes or monitoring of physical or mental health problems: Patient indicated less pain this week.   Indicate involvement by any outside supports: Yes: Patient reports current probation.   IAPP reviewed and modified as needed: NA    Pt working on the following dimensions:  Dimension #1 - Withdrawal Potential - Risk 0. Patient reports last use of methamphetamine and alcohol on 2/19/19 at time of intake. Patient has had multiple reported relapse while in group. Patient reported using on 4/23/19, and was informed that he cannot attend group high.  Patient was requested to complete UA on 5/17/19, which was positive for amphetamines. Patient was requested to complete a UA on 5/29/19 which was negative for all tested substances.Patient denies recent withdrawal symptoms.  Specific goals from treatment plan addressed this week:  Patient did not report any substance use this week.  Patient was requested to complete a UA on 6/14/19, which was negative for all tested substances.   Effectiveness of strategies:  Strategies appear effective for now.   Dimension #2 - Biomedical - Risk 0. Patient reports stable health. Patient has [employer] insurance. Patient does not have primary care provider. Patient is able to seek cares as needed  Specific goals from treatment plan addressed this week:  Patient reported no significant changes to physical health this week.    Effectiveness of strategies:  Strategies appear effective, staff to continue to monitor.   Dimension #3 -  Emotional/Behavioral/Cognitive - Risk 2. Patient has concerns about depression, ADHD, OCD, grief, and bi-polar. Patient does not have mental health care provider. Patient reports recently experiencing mental health symptoms. Patient denies suicidal ideation. Patient has suicide attempt in 2004.  Active interventions to stabilize mental health symptoms:  Patient attended MICD group.   Specific goals from treatment plan addressed this week:  Patient discussed challenging his thinking related to how other view him.   Effectiveness of strategies:  Strategies appear effective.   Dimension #4 - Readiness for Change - Risk 0. Patient verbalizes a readiness and willingness for change.  Specific goals from treatment plan addressed this week:  Patient to attended 3/3 groups and inform staff of needing to leave early two days.   Effectiveness of strategies:  Strategies appear effective.   Dimension #5 - Relapse Potential - Risk 3. Patient lacks healthy, positive coping skills. Patient lacks skills to prevent relapse. Patient may not recognize impact substance use has on mental health. Patient has had several treatment episodes. Patient has had significant periods of sobriety in the past, mostly while incarcerated. Patient has had five reported relapse while in treatment.   Specific goals from treatment plan addressed this week:  Patient reported no use and minimal urges, reports he has been keeping busy.   Effectiveness of strategies:  Strategies appear effective.   Dimension #6 - Recovery Environment - Risk 2. Patient is employed. Patient has stable housing. Patient has poor boundaries with using individuals. Patient has support system. Patient is currently on probation with [facility]. Patient has several past legals.  Specific goals from treatment plan addressed this week:  Patient reports that he has been taking his mother to appointments. Patient reports his sponsor is challenging to him, but reports this is a  positive. Patient reports he will be leading a topic meeting next week.   Effectiveness of strategies:  Strategies appear effective.    T) Treatment plan updated: no  Patient notified and in agreement: patient signed plan on 6/5/19.   Patient educated on Thinking. Patient has completed 49 of 90 program hours at this time. Projected discharge date is 6/28/19 Current discharge plan is TBD.     TATIANNA Valera, [facility]  6/14/2019, 2:57 PM       Weekly Educational Topics Date   1. Dual Diagnoses, week 1 5/13, 5/17   2. Dual Diagnoses, week 2 5/24   3. Stress Management 5/29   4. Feelings/Emotions 6/3, 6/7   5. Thinking 6/10, 6/12, 6/14   6. Change    7. Recovery Support 3/27, 3/29   8. Relationships/Communication 4/1, 4/3   9. Addiction 101 4/9, 4/10   10. Relapse Prevention 4/17   11. Grief and Loss 4/22, 4/23, 4/26   12. Strengths 4/30, 5/3   13. Wellness 5/8   Mindfulness  3/29, 4/26, 5/3, 6/7, 6/14

## 2021-05-29 NOTE — PROGRESS NOTES
"D. Counselor met with patient for 45 minutes to discuss treatment progress. A. Counselor inquired into patient's living arrangements. Counselor presented treatment attendance and participation agreement, and discussed that an further unexcused absences or leaving group early without discussing staff would result in immediate discharge and if there was any additional use patient would be referred to a higher level of care. Counselor encouraged patient to think about his self worth related to how he is living within his own values versus living according to others' standards. R. Patient reported that he is living with his mother again and attempting to help with her care, reports continuing to attend recovery groups and meet with his sponsor. Patient reported understanding expectations going forward. Patient reported feeling like certain things are \"deserved\" and his frustrations with his family's expectations. Patient discussed he was happiest when helping other people. T. Patient to continue with phase I.     Patient treatment was reviewed, patient signed updated copy.  Patient was actively and directly involved in the treatment plan review and updates.     TATIANNA Valera, [facility]  6/7/2019, 3:51 PM  "

## 2021-05-29 NOTE — PROGRESS NOTES
Gustavo Saleh Jr. attended 1 hours of group therapy today.    Total group size of 11.    5/24/2019 12:10 PM Stephy Sanz

## 2021-05-29 NOTE — PROGRESS NOTES
Gustavo Saleh Jr. attended 3 hours of group therapy today.    Total group size of 6.    6/7/2019 12:01 PM Stephy Sanz

## 2021-05-29 NOTE — PROGRESS NOTES
Gustavo Saleh Jr. attended 2 hours of group therapy today. Patient informed staff at the start of group that he had to leave early to take his mother to an appointment.     Total group size of 8.    6/10/2019 12:01 PM Stephy Sanz

## 2021-05-30 NOTE — TELEPHONE ENCOUNTER
STEVEN. Counselor received call from patient. SIVAKUMAR. Counselor discussed that the patient's situation has been staffed with other program staff and with clinical supervisor and that the decision is that the patient is discharged with a recommendation to a higher level of care. Counselor discussed that while the patient has made progress in outpatient treatment, there has been repeated relapses and patient has continued to use after signing treatment participation and attendance agreement, which indicates that the patient needs a higher level of care. Counselor discussed that in order to make a referral to another program, the patient would need to sign release of information. Counselor agreed to meet with patient at 2:30pm on 6/25/19 to discuss these referrals, but not to discuss changing recommendation. R. Patient asked about counselor's recommendation, expressed that he did not feel that the progress he has made is being considered. Patient reported needing to make arrangement prior to going a higher level of care, and asked what the next steps are. Patient requested to meet with counselor at 2:30pm on this date to discuss referrals. T. Counselor to meet with patient at 2:30pm on 6/25/19 to discuss referrals to a higher level of care.     TATIANNA Valera, [facility]  6/25/2019, 10:11 AM

## 2021-05-30 NOTE — PROGRESS NOTES
Counselor met with patient briefly to discuss referral to a higher level of care and sign releases. Counselor discussed recommendation of residential treatment or IOP with board and lodge, patient chose to sign release for [facility]. Counselor to complete discharge summary for referral, and follow up with patient as necessary for referral.     TATIANNA Valera, [facility]  6/25/2019, 2:43 PM

## 2021-05-30 NOTE — PROGRESS NOTES
[facility] SUBSTANCE USE DISORDER  DISCHARGE SUMMARY        Name:  Gustavo Saleh Jr.   :  TATIANNA Valera LADC   Admit Date: 3/19/19   Discharge Date: 19   :  1970   Hours Completed: 57   Initial Diagnosis:  Stimulant Use Disorder, Severe, (F15.20) (304.40) Amphetamine type substance and Alcohol Use Disorder, Moderate, (F10.20) (303.90)   Final Diagnosis:  Stimulant Use Disorder, Severe, (F15.20) (304.40) Amphetamine type substance and Alcohol Use Disorder, Moderate, (F10.20) (303.90)   Discharge Address:    [address] [place] [id number]   Funding Source:    [facility]     Discharge Type:  At Staff Request (ASR), recommended to a higher level of care.     Client was receiving residential services at the time of discharge:   No      Reasons for and circumstances of service termination:  Patient completed intake on 3/19/19 and started outpatient group on 3/27/19. Patient reported multiple relapses shortly after starting treatment, and then showed some improvement in maintaining abstinence for short periods of time. Due to relapse and patient's inconsistent attendance, patient signed a treatment contract on 19 that any additional unexcused absences would result in discharge and that any additional use would result in a referral to a higher level of care. Patient was requested to complete a UA on 19 and was informed that if this UA was missed, it would be considered a positive UA. Patient completed UA on 19, and resulted indicated that the sample was diluted. Patient was requested to complete a observed UA on 19, at which time the patient admitted to using methamphetamines and diluting the sample. Due to patient's repeated use while in outpatient, patient is discharged as of  with a recommendation to a higher level of care.        If program discharge status was At Staff Request, the license cristina must identify the following:    Other  interested parties conferred with: other outpatient staff, patient's  was informed of intention to discharge.     Referrals provided: Patient is recommended to residential treatment at [facility].     Alternatives considered and attempted before deciding to discharge:  After each reported use, it was discussed with patient changes to be made in his environment to support recovery. A treatment contract for attendance and abstinence was implemented on 6/7/19 prior to discharge.       Dimension/Course of Treatment/Individualized Care:   1.  Withdrawal Potential - Intake Risk level -  0 Discharge Risk level - 0  Narrative supporting risk description:  Patient reported using methamphetamines the week of 6/17/19, patient did not indicate when last using alcohol. Patient has multiple relapses while in outpatient treatment. Patient does not report or display any withdrawal concerns at time of discharge.    Treatment plan goals and progress towards those goals:  Patient reported multiple relapses while in outpatient treatment, with the last during the week of 6/17/19, prior reported relapse with methamphetamines on the following dates: 3/28/19, 4/12/19,  4/23/19 and positive UA on 5/29/19 for amphetamines. Patient ws requested to complete a UA on 6/21/19 and was reminded that a missed UA would be considered a positive. Patient's chart indicated that the patient had not taken the UA prior to 1:30pm, thus resulting in intent to discharge. Patient contact counselor that afternoon, reporting that he would come back to the hospital to complete the UA. Patient's chart indicate the UA was completed on 6/21/19, but results were diluted. Patient admitted on 6/24/19 to using meth and diluting sample for 6/21/19.      2.  Biomedical Conditions and Complications - Intake Risk level -  0 Discharge Risk level - 0  Narrative supporting risk description:  Patient reports stable health. Patient has [employer]  insurance. Patient does not have primary care provider. Patient is able to seek cares as needed  Treatment plan goals and progress towards those goals:  Patient reported should scope while in outpatient and plan to follow with physical therapy. Patient did not report any significant changes to health while in outpatient treatment. Patient did not report any changes to tobacco use while in outpatient treatment.      3.  Emotional/Behavioral/Cognitive Conditions and Complications - Intake Risk level -  2 Discharge Risk level - 2  Narrative supporting risk description:  Patient reported MH concerns of depression, ADHD, OCD grief and bipolar. Patient does not have a mental health provider. Patient denies SI/HI.   Treatment plan goals and progress towards those goals:  Patient met with primary counselor to complete brief DA on 3/25/19, diagnoses at that time were Major depressive disorder, recurrent, moderate and Generalized anxiety disorder. Counselor and patient discussed potential referrals for mental health, but patient cited having too many responsibilities to see a therapist. Patient discussed in group difficulty coping with his mother's terminal cancer and his sense of self related to his accomplishment. Patient displayed some use of mindfulness as a coping skill.      4.  Readiness for Change - Intake Risk level -  0 Discharge Risk level - 2  Narrative supporting risk description:  Patient reports a strong internal desire for recovery, but also identifies legal concerns as external motivator.   Treatment plan goals and progress towards those goals:  Patient displayed inconsistent attendances, particularly in calling in frequently for absences and leaving group early without informing staff. Patient signed treatment attendance and participation agreement on 6/7/19. Patient was informed of discharge with recommendation to a higher level of care, reported willingness to follow recommendation and signed release for  [facility] residential treatment.      5.  Relapse/Continued Use/Continued Problem Potential - Intake Risk level -  3 Discharge Risk level - 4  Narrative supporting risk description:  Patient reports history of previous treatments in the past, including two prior attempts at outpatient in the past year. Patient reports significant periods of abstinence in the past. Patient appears to have some awareness of coping skills, but is unable to utilize these tools at this time.    Treatment plan goals and progress towards those goals:  Patient has had five known relapses while in outpatient treatment. Patient identified some awareness of his urges, primarily focused on remaining busy as a coping skill.      6.  Recovery Environment - Intake Risk level -  2 Discharge Risk level - 3  Narrative supporting risk description:  Patient has stable housing. Patient has poor boundaries with using individuals. Patient has support system. Patient is currently on probation with [facility]. Patient has several past legals.  Treatment plan goals and progress towards those goals:  Patient discussed while in treatment how living at home is not helpful for his recovery and attempted living a sober house, but eventually returned home. Patient reported attending recovery meetings and meeting with his sponsor frequently. Patient discussed in group difficulty setting boundaries with his family, and wanting people to be proud of him.      Strengths: kindness, friendly.   Needs: more structure and accountability   Services Provided: Intake, assessment, treatment planning, education, group discussion, film, lectures, 1x1 therapy, and recommendations at discharge.        Program Involvement: Good  Attendance: Fair  Ability to relate in group/   Other program activities: Good  Assignment Completion: Fair  Overall Behavior: Fair  Reported Family/Significant   Other Involvement: Poor    Prognosis: Poor      Recommendations       Patient is  recommended to residential treatment at [facility].      Mental Health Referral  Patient would likely benefit from individual therapy and a referral to psychiatry to assess for medication management.       Physical Health Referral: Patient is encouraged to establish care with a primary care physician.          Counselor Name and Title:  TATIANNA Valera, [facility]        Date:  6/28/2019  Time:  8:13 AM

## 2021-05-31 NOTE — PROGRESS NOTES
D)Left messages to both home phone and cell phone.  Asked the patient to call back to reschedule intake.

## 2021-05-31 NOTE — PROGRESS NOTES
D)Patient left a message apologizing for not attending groups  A) Pt said ANTONIO Travisalana did not work out and he is looking for a place to live.

## 2021-05-31 NOTE — PROGRESS NOTES
Late Entry from 2019   Weekly Progress Note  Patient Name: Gustavo Saleh   : 1970  MRN: [id number]    D) Pt attended zero groups this week with 2 absences. Patient attended zero individual sessions this week. A) Staff facilitated groups and reviewed tx progress. Assessed for [facility]. R) No VAP needed at this time.   Any significant events, defines as events that impact patient s relationship with others inside and outside of treatment: No   Indicate any changes or monitoring of physical or mental health problems: None     Indicate involvement by any outside supports: Yes. Pt reports he attends NA meetings and works with a sponsor Gaston Curiel.  Pt states he has about 15 sober friends from the meeting that he can count on.    IAPP reviewed and modified as needed.NA  Pt working on the following dimensions:    Dimension #1 - Withdrawal Potential - Risk 0.  Last use of meth on 2019.   No concern reported at intake on . Patient has been no call and no show to groups since intake.     Specific goals from treatment plan addressed this week:   Remain sober and comply with UA request.  If there is any issue with relapse, report to CD counselor.    Effectiveness of strategies:   Immediately after the intake, patient was no call and no show for all groups.        Dimension #2 - Biomedical - Risk 1. Pt does not report any concern for his physical health.  Pt states he goes to gym and experiences.  Pt lost front teeth due to boxing.  Pt states he wants to get partials so he can improve his self-esteem.  No established primary care.    Specific goals from treatment plan addressed this week:   Pt  to maintain his overall health and establish primary care.    Effectiveness of strategies:   Pt is recommended to establish PCP.      Dimension #3 - Emotional/Behavioral/Cognitive - Risk 3. Pt does not have formal MH diagnosis yet.  Pt states while he was in residential treatment at [place], they conducted a brief  screening and suspected Bipolar Disorder.  Pt states he wants to get DA done and start therapy and psychiatry as well if necessary.    Specific goals from treatment plan addressed this week: start CD group   Effectiveness of strategies:  Patient was no show to all groups so far since intake.      Dimension #4 - Treatment Acceptance/Resistance - Risk 3. Patient is mandated by probation to complete treatment.  Probation is scheduled to term on 12/26/19.  Pt did not attend any groups since the intake.    Specific goals from treatment plan addressed this week:  Comply with treatment and probation.    Effectiveness of strategies:  Ineffective. Pt asked for intake on the same day as CD group so he can start on the same day.   He finished intake and was no call and no show to both groups on his first week on the treatment.      Dimension #5 - Relapse Potential - Risk 2.  Last use of meth on 6/4/2019.  Pt admits he was using 3-4 times a week prior to his last use.  Pt went through a residential treatment and started outpatient treatment.  Pt identifies the reason for the last relapse as mother s health.  Pt states it was very difficult to watch his mother deteriorate with her health.  Pt states she is moving to a senior home at [facility].  Pt will benefit from getting more education and skills development on sobriety.    Specific goals from treatment plan addressed this week:    Developing skills for Relapse prevention.   Effectiveness of strategies:  Patient did not start CD groups on his first week of the treatment.       Dimension #6 - Recovery Environment - Risk 3.  Pt stated at intake that he was ready to move into [facility] on the weekend.  Pt is mandated by probation to complete treatment but his probation will term on 12/26/2019.  Pt has established NA meetings and group of sober friends.  Pt is engaged in healthy activities such as gym boxing, fishing, oil painting, wood burning, hunting, and outdoor  "activities.    Specific goals from treatment plan addressed this week:  Maintain healthy support network.  Comply with [facility] rules and probation.    Effectiveness of strategies:  Patient is incompliant with treatment due to not attending groups on his first week.      T) Treatment plan updated: No.   Patient notified and in agreement: NA  Patient educated on: none.    Patient has completed 1 of 200 program hours at this time. Projected discharge date is 2/28/2020. Current discharge plan is TBD.     ORESTES Blankenship  Date/Time:  8/30/2019  3:30 pm     Psycho-Educational Curriculum  Date Attended  Psycho-Educational Curriculum  Date Attended    Acceptance   Shame/Guilt     1st Step   Anger/Rage     Affirmations   Mental Health     Automatic Negative Thoughts   Anxiety     Cross Addiction   Co-Occurring Disorders     Stages of Change   Taniya/Bipolar     Relapse   Trauma      Addictive Thoughts   Victim Identity     Coping Skills   Sober Structure     Relapse Prevention   Continuum of Care     Medical Aspects   Non-12 Step Support     Brain/Neurotransmitters   Priorities     Medication Compliance   Spirituality     ODETTE Alcohol/Drug Research   Weekend Planner     Physical Health   Educational Videos     Post Acute Withdrawal   1st Step     Pregnancy and Drug Use   2nd Step     Sexual Health   Assertive Communication     Short-Term/Long-Term Effects   My name is Mark WOODMason    Relationships   Cross Addiction     Assertive Communication   God As We Understood Him     Boundaries   HBO Relapse     Codependence    HBO What Is Addiction     Defense Mechanisms    Medical Aspects 1     Family Roles   Medical Aspects 2     Goodbye Letter   [employer]: Stress     Intimacy   PBS Depression Out of the Shadows     Socialization Skills   [place]     Feelings   Chris Guzmán \"Highjacked Brain\"    ABC Model of Emotion   Piyush Fregoso Humor in [place]    Grief and Loss   The Mindfulness Movie    Healthy vs. Unhealthy Feelings   Mark " W. documentary     Meditation/Mindfulness   Recreational/sober activities     Overconfidence/Complacency       Resentments       Stress

## 2021-05-31 NOTE — PROGRESS NOTES
"      Kyra Zayas, [facility]  8/22/2019, 10:34 AM Intake Note:     D) Gustavo Saleh Jr. is a 48 y.o.  single White or  male who is referred to CD outpatient Service Coordination via [facility] with funding from  [employer].  Patient orientated x 3. Patient meets criteria for Stimulant Related Disorder, Amphetamine Type, Severe (F15.20).  Patient appears appropriate for CD outpatient treatment.    A) Met with patient for 45 minutes.  Completed intake assessment and preliminary paperwork. Patient was given and explained counselor & supervisor license number and contact info, Patient Bill of Rights, program rules/regulations, Program Abuse Prevention Plan, confidentiality & HIPPA policies, grievance procedure, presented ROIs, TB & HIV/AIDS policies & resources, and Vulnerable Adult policy.   Conducted Vulnerable Adult Assessment.   R)No special Vulnerable Adult needed at this time.  Patient signed and agreed to counselor & supervisor license number and contact info, Patient Bill of Rights, group rules/regulations, Program Abuse Prevention Plan, confidentiality & HIPPA policies, grievance procedure,  ROIs, TB & HIV/AIDS policies & resources, and Vulnerable Adult policy. Patient scored low risk on C-SSRS screen. Patient denied suicidal ideation/intent/plan/means at this time.     Opioid Use Disorder: No   Provided \"Options for Opioid Treatment in [place] and Overdose Prevention\"     Dimension #1 - Withdrawal Potential - Risk 0. Last use of meth reported as 6/4/2019.  Pt does not report any concern  for withdrweals at intake.      Dimension #2 - Biomedical - Risk 1. Pt reports his physical health is well at this time.  Pt states he was engaged in boxing and lost front teeth. Pt states he wants to get partials to increase his self-esteem.  No established primary care.      Dimension #3 - Emotional , Behavioral and Cognitive - Risk 2. Pt reports he had a brief mental health screening while " he was a the residential treatment [place].  Pt reports they suspected bipolar disorder.  Pt does not have established MH services at this time.   Pt states he wants to get a formal DA done at [facility].  Pt states he is open to psychiatric medications.      Dimension #4 - Readiness for Change - Risk 0.  Patient completed a residential treatment and came for outpatient intake today.  Pt reports he actually appreciate probation involvement because it provided him with more structure and healthy life style. Pt states his probation will term on 2019.  Pt seems genuinely motivated to get healthy.      Dimension #5 - Relapse Potential - Risk 2.  Last use of meth on 2019.  Pt reports the longest sobriety outside the shelter is 2.5 years by working with NA sponsor.  Pt states he is still engaged in NA meetings and has about 15 sober friends.  Pt identifies reason for the last relapse as his mother's deteriorating health.  Pt states his mother decided to move into a senior living home at [facility] and he is helping to organize her town home.  Pt will be moving into a group residential housing [facility] by this weekend.  Pt seems to need more education and skill development on long-term sobriety.      Dimension #6 - Recovery Environment -2. Pt has an established sober support network in the community.  Pt is mandated by probation to complete the treatment.  Probation will term on 2019.  Pt is in the transition of moving into a sober house, [facility] by this weekend.  Pt states he has lots of activities he does: oil painting, wood burning, fishing, outdoor, boxing, hunting.  Pt states of 120 extended [place] descendant family, about 20  due to addiction.      T) Explained counselor & supervisor license number and contact info, Patient Bill of Rights, program rules/regulations, Program Abuse Prevention Plan, confidentiality & HIPPA policies, grievance procedure, presented ROIs, TB & HIV/AIDS policies  & resources, and Vulnerable Adult policy. Patient expected to start group on today.      ORESTES Blankenship  8/22/2019, 10:34 AM

## 2021-05-31 NOTE — PROGRESS NOTES
D)patient completed intake this morning and planned on attending group this afternoon.    A) Pt did not show up to group.

## 2021-05-31 NOTE — PROGRESS NOTES
Individual Treatment Plan    Patient  Name: Gustavo Saleh Jr.  MRN: [id number]   : 1970  Admit Date: 2019   Date of Initial Service Plan: 2019   Tentative Discharge Date: 2020     Diagnosis: Stimulant Use Disorder, Amphetamine Type, Severe,  F15.20)     Counselor: ORESTES Blankenship      Dimension 1: Acute Intoxication/Withdrawal Potential, Risk level: 0    Problem Statement from Comprehensive Assessment:  last use reported as 2019.  No concern regarding withdrawal reported by the patient.     Problem: Pt reports no problem related to use or withdrawals at intake.    Goal: Patient to maintain abstinence throughout outpatient treatment.   Must be reached to complete treatment? Yes  Methods/Strategies (must include amount and frequency):   1. Patient to report any substance/alcohol use to counselor to determine if any changes need to be made to address withdrawal symptoms.   2. Patient to complete any requested UAs.   Target Date: 2020  Completion Date:     Dimension 2: Biomedical Conditions/Complications, Risk level: 1    Problem Statement from Comprehensive Assessment:  Pt reports there is no medical issues except for his need for partial.  Pt states he lost front teeth during boxing.  Pt does not have any established primary care.      Problem: missing front teeth after boxing.  No established primary care clinic.    Goal: Patient to maintain stable health throughout outpatient treatment.   Must be reached to complete treatment? No  Methods/Strategies (must include amount and frequency):   1. Patient to report any changes to physical health to counselor.   2. Patient to establish primary care clinic.    3. Patient to make an appointment with a dentist to work toward getting his front teeth.    Target Date: 2020  Completion Date:     Dimension 3: Emotional/Behavioral/Cognitive, Risk level: 2    Problem Statement from Comprehensive Assessment:  Pt reports he is sober for  about 2 months and trying to move into a sober house this weekend.  Pt states his mother is transitioning into a senior home at [facility] and he is helping her with the moving.  Pt was at [facility] and relapsed and went to a residential treatment at [place].  Pt reports [facility] conducted a brief mental health screening and suspected bipolar disorder.   Pt states he wants to establish MH at [facility].  Pt reports he does not have history with [facility].      Problem: Mental health services not established.  Pt belives he may have bipolar disorder.    Goal: to establish mental health services and work toward managing symptoms.    Must be reached to complete treatment? Yes  Methods/Strategies (must include amount and frequency):   1. Patient to schedule DA at [facility] outpatient mental [facility] clinic.    2. Pt will attend two CD outpatient groups per week: Tuesdays and Thursdays, 12:30 to 3:30 pm.    Target Date: 2/28/2020.   Completion Date:       Dimension 4: Readiness to Change, Risk level 0    Problem Statement from Comprehensive Assessment:  Pt states he is sober for 2 months and cooperating with probation.  Pt completed a residential treatment and came to outpatient intake today.  Pt states there were many deaths (related to addiction) in the family but he wants to live.  Pt reports probation actually turned out to be a positive outcome for him since he was encouraged to get help.  12 felonies, 53 arrests, 4 prisons, 15 CD treatments.   Pt states he wants to build a foundation for healthy life.      Problem: probation involvement   Goal: Patient to comply with probation.  Probation is scheduled to term on 12/26/2019.    Must be reached to complete treatment? Yes  Methods/Strategies (must include amount and frequency):   1. Patient will remain in good contact with both CD counselor and .  Pt will call CD counselor in advance if he cannot attend CD group.  [phone].    Target Date:  2/28/2020  Completion Date:     Dimension 5: Relapse/Continued Use/Continued Problem Potential, Risk level: 2    Problem Statement from Comprehensive Assessment:  Pt reports seeing his mother deteriorate with her health was difficult and he relapsed.  Pt states his sister passed away and he is the only surviving child.  Pt reports his mother will be moving into a senior assisted living home at [facility].  Pt agrees he needs more skills to stay sober for long period of time.  Pt is looking for more structure as well and accept other peoples way, not his way.   Pt reports still connected with  sponsor.      Problem: history of multiple treatments without success   Goal: gain skills for long term sobriety.  Gain skills to be able to cope with extreme emotions/stress.    Must be reached to complete treatment? Yes  Methods/Strategies (must include amount and frequency):   1. Patient to share in [facility] check-in any urges and addictive thinking to better understand his pattern of use and to prevent relapse in the future.   2. Patient will be educated on negative feelings.  Pt will explore his own negative feelings and share in CD group how those affected him negatively in the past.   Pt will develop relapse prevention before completing outpatient CD treatment.    Target Date: 2/28/2020   Completion Date:     Dimension 6: Recovery Environment, Risk level: 2     Problem Statement from Comprehensive Assessment:  By this weekend (8/25/19), patient will be moving into [facility].  Pt has  sponsor and good network of sober friends to really on.  Pt has a supportive mother as well.  Pt goes to gym to exercise and boxing.  Pt has sober activities: wood burning, oil painting, fishing, hunting, outdoor.   On probation till 12/26/2019 with [facility].      Problem: Pt struggles with long term sobriety despite having sober support network.    Goal: gain skills for long term sobriety and be able to cope with emotions and  stress.    Must be reached to complete treatment? Yes  Methods/Strategies (must include amount and frequency):   1. Pt will maintain his NA meetings and contact with NA sponsor.   2. Pt will work with a Telephone  and get a weekly telephone support.    3. Pt will attend monthly CD recreational groups. Last Wednesday of the month, 11:30 am-1:30 pm.    Target Date: 2/28/2020   Completion Date:     Resources  Resources to which the patient is being referred for problems when problems are to be addressed concurrently by another provider: [facility]        By signing this document, I am acknowledging that I was actively and directly involved in the development of my treatment plan.           Patient  Signature_________________________________________         Date__________________        Staff Signature  ORESTES Blankenship    Date: 8/22/2019   12 pm

## 2021-05-31 NOTE — PROGRESS NOTES
D)Patient was no call and no show to today's CD outpatient intake.    A)Earlier today, patient's former residential counselor from [facility] left a message to [facility] stating patient went on a weekend pass on Friday and never came back.  [facility] counselor said they reached to him but he never answered the phone and they discharged him from the facility.

## 2021-05-31 NOTE — PROGRESS NOTES
Late Entry from 2019   Weekly Progress Note  Patient Name: Gustavo Saleh   : 1970  MRN: [id number]    D) Pt attended 1/2 groups this week with one absence. Patient attended 1 individual sessions this week. A) Staff facilitated groups and reviewed tx progress. Assessed for [facility]. R) No VAP needed at this time.   Any significant events, defines as events that impact patient s relationship with others inside and outside of treatment: No   Indicate any changes or monitoring of physical or mental health problems: None     Indicate involvement by any outside supports: Yes. Pt reports he attends NA meetings and works with a sponsor Gaston Curiel.  Pt states he has about 15 sober friends from the meeting that he can count on.    IAPP reviewed and modified as needed.NA  Pt working on the following dimensions:    Dimension #1 - Withdrawal Potential - Risk 0.  Last use of meth on 2019.   No concern reported at intake on . Patient has been no call and no show to groups since intake.     Specific goals from treatment plan addressed this week:   Remain sober and comply with UA request.  If there is any issue with relapse, report to CD counselor.    Effectiveness of strategies:   Immediately after the intake, patient was no call and no show for all groups.        Dimension #2 - Biomedical - Risk 1. Pt does not report any concern for his physical health.  Pt states he goes to gym and experiences.  Pt lost front teeth due to boxing.  Pt states he wants to get partials so he can improve his self-esteem.  No established primary care.    Specific goals from treatment plan addressed this week:   Pt  to maintain his overall health and establish primary care.    Effectiveness of strategies:   Pt is recommended to establish PCP.      Dimension #3 - Emotional/Behavioral/Cognitive - Risk 3. Pt does not have formal MH diagnosis yet.  Pt states while he was in residential treatment at [facility], they conducted a brief  screening and suspected Bipolar Disorder.  Pt states he wants to get DA done and start therapy and psychiatry as well if necessary.  Patient missed Tuesday group and came in late for Thursday group.  Pt said moving into [facility] did not work out due to his 12 felonies.  Pt met with CD counselor and completed [facility] Statement of Need.  The form was faxed to [facility].  Pt have to call the  to set up an face-to-face interview.  Pt sates he has places to stay meanwhile.     Specific goals from treatment plan addressed this week: start CD group   Effectiveness of strategies: ineffective.  Pt was no show to Tuesday group.  Patient showed up to group late on Thursday.      Dimension #4 - Treatment Acceptance/Resistance - Risk 3. Patient is mandated by probation to complete treatment.  Probation is scheduled to term on 12/26/19.  Pt attended 1/2 group and late.    Specific goals from treatment plan addressed this week: Comply with treatment and probation.    Effectiveness of strategies:Ineffective. Pt is encouraged to start attending treatment on time.      Dimension #5 - Relapse Potential - Risk 2.  Last use of meth on 6/4/2019.  Pt admits he was using 3-4 times a week prior to his last use.  Pt went through a residential treatment and started outpatient treatment.  Pt identifies the reason for the last relapse as mother s health.  Pt states it was very difficult to watch his mother deteriorate with her health.  Pt states she is moving to a senior home at [facility].  Pt will benefit from getting more education and skills development on sobriety.    Specific goals from treatment plan addressed this week: Developing skills for Relapse prevention.   Effectiveness of strategies: Patient is encouraged to attend treatment groups on time    Dimension #6 - Recovery Environment - Risk 3.  Pt was unable to move into [facility] due to his felonies.  Pt is referred to [facility].    Pt is mandated by probation to  complete treatment but his probation will term on 12/26/2019.  Pt has established NA meetings and group of sober friends.  Pt is engaged in healthy activities such as gym boxing, fishing, oil painting, wood burning, hunting, and outdoor activities.    Specific goals from treatment plan addressed this week: Maintain healthy support network.  Call Parth Downs, the manager at [facility] and set up interview.    Effectiveness of strategies: Patient is recommended to keep attending NA meetings, remain contact with his sponsors, and contact [facility].      T) Treatment plan updated: No.   Patient notified and in agreement: NA  Patient educated on: SMART Goal Setting.    Patient has completed 3 of 200 program hours at this time. Projected discharge date is 2/28/2020. Current discharge plan is TBD.     ORESTES Blankenship  Date/Time:  8/30/2019  3:30 pm     Psycho-Educational Curriculum  Date Attended  Psycho-Educational Curriculum  Date Attended    Acceptance   Shame/Guilt     1st Step   Anger/Rage     Affirmations   Mental Health     Automatic Negative Thoughts   Anxiety     Cross Addiction   Co-Occurring Disorders     Stages of Change   Taniya/Bipolar     Relapse   Trauma      Addictive Thoughts   Victim Identity     Coping Skills   Sober Structure     Relapse Prevention   Continuum of Care     Medical Aspects   Non-12 Step Support     Brain/Neurotransmitters   Priorities     Medication Compliance   Spirituality     ODETTE Alcohol/Drug Research   Weekend Planner     Physical Health   Educational Videos     Post Acute Withdrawal   1st Step     Pregnancy and Drug Use   2nd Step     Sexual Health   Assertive Communication     Short-Term/Long-Term Effects   My name is Mark WOODMason Moeller   Cross Addiction     Assertive Communication   God As We Understood Him     Boundaries   HBO Relapse     Codependence    HBO What Is Addiction     Defense Mechanisms    Medical Aspects 1     Family Roles   Medical Aspects 2    "  Goodbye Letter   [employer] Geographic: Stress     Intimacy   PBS Depression Out of the Shadows     Socialization Skills   [place]     Feelings   Chris Guzmán \"Highjacked Brain\"    ABC Model of Emotion   Piyush Fregoso Humor in [place]    Grief and Loss   The Mindfulness Movie    Healthy vs. Unhealthy Feelings   Mark BORDEN documentary     Meditation/Mindfulness   Recreational/sober activities     Overconfidence/Complacency   SMART Goal setting  8/29/19    Resentments       Stress           "

## 2021-05-31 NOTE — PROGRESS NOTES
D) Patient attended 2 hours of group on 8/29/2019.  Patient arrived late but participated in group lecture.    A) Patient s engagement in the group session: high.   Patient was introduced to group rules, structure and members.  Patient stated he was in need of finding a sober house because [facility] did not work out due to his felony.  Pt states he has 12 felonies.  This writer met with the patient after the group for 10 min so he can sign [facility] Statement of Needs to get a referral to another [facility] which is more felony-friendly.   This writer spoke with  [facility]. They are full at this time but accepting applications and will do interview to place him on the wait list.  Pt states he has 2 NA sponsors and a lot of sober friends he can stay temporarily until [facility] has opening.    R) Total number of attendants: 6  T) Group topic: Setting SMART Goals

## 2021-05-31 NOTE — PROGRESS NOTES
D)Pateint called to reschedule the intake  A)Pt said there is an hour delay on his cat  due to severe rain.  Pt rescheduled intake for tomorrow at 10 am.     
declines

## 2021-05-31 NOTE — PROGRESS NOTES
Addiction Services - Initial Services Plan     Patient  Name: Gustavo Saleh Jr.  MRN: [id number]   : 1970  Admit Date: 2019       Patient describes their immediate need: To learn recovery skills to prevent relapse.     Are there any immediate Safety Needs such as (physical, stability, mobility): No.  Pt is able to get medical care as needed. Pt denies immediate concerns.     Immediate Health Needs and Plan: NA. Pt reports his overall health is in good.        Vulnerable Adult: No     Issues to be addressed in the first sessions: Prepare for moving into group residential housing.        Patient strengths and needs:   Strengths identified as established sober network of friends, multiple activities to keep busy (boxing, oil painting, fishing, hunting, outdoor activities)   Needs identified as finding a permanent housing.      Plan for patient for time between intake and completion of the treatment plan:   Attend all group therapy sessions as directed, complete all written and oral assignments as directed, and remain clean and sober. A relapse, if any, must be reported to staff immediately in order to ensure you are receiving the proper level of care. If you cannot attend a group therapy session you must call contact information provided in intake folder and leave a message before or during group hours.         Vulnerable Adult Review   [X] Review of the Facility Abuse Prevention plan was reviewed with the patient   [X] No Individual Abuse Plan is necessary   [ ] In addition to the Facility Abuse Prevention plan, an Individual Abuse Plan will be put in place       Staff Name/Title: ORESTES Blankenship   Date: 2019  Time: 11:02 AM

## 2021-05-31 NOTE — PROGRESS NOTES
D) patient called to rescheduled intake.   A) Pt said he was busy helping his mother's arrangement for nursing home.  Pt asked to get intake done on the same day as CD group and start the CD group  imemdiately.    T) intake scheduled for Tuesday 8/20 at 10:30 am.

## 2021-05-31 NOTE — PROGRESS NOTES
D)pateint called and left a message  A)Pt declared he is coming for intake tomorrow at 10 am. Pt said he already arranged transportation for CD groups on Tuesdays and Thursdays as well.

## 2021-06-01 NOTE — PROGRESS NOTES
D) called.  PO said she is frustrated wit the patient because he is not picking up the phone.   PO stated she was planning on violating his probation 6 months ago, but decided to give him a chance since he was going through difficult time with his mother's health.  PO said her meeting with the patient Last Thursday 9/5 was at 11:30 am and he could easy be able to attend treatment at 12:30 pm.  On 9/5, patient arrived at 2:30n pm and had a positive UA for meth.    A) PO was notified the patient initially kept cancelling the intakes.  Once he completed treatment, he only 3 hours of CD groups of 18 hours offered.  PO said she will not give him any more chance and will violate his case.  Patient will be discharged form outpatient treatment with a recommendations to follow the order from court/probation.  Patient will not be recommended for another treatment since he just finished [facility] treatment.

## 2021-06-01 NOTE — PROGRESS NOTES
D)Patient called from his 's office.    A) Pt states asked to fax treatment verification letter to his  Reyna acuna.  Pt states he went to a housing interview at [facility] and did not like it because there were too many residents and the place was dirty.    T) Tx verification faxed to PO.

## 2021-06-01 NOTE — PROGRESS NOTES
Late Entry from 9/5/2019    D) Patient attended 1 hour of group on 9/3/2019.    A) Patient s engagement in the group session: high.   R) Total number of attendants: 6  T) Group topic: [place]     [facility]as [facility]  9/3/2019,  3:30 pm

## 2021-06-01 NOTE — PROGRESS NOTES
"[facility] SUBSTANCE USE DISORDER  DISCHARGE SUMMARY      Name:  Gustavo Saleh    :  Kyra Zayas [facility]   Admit Date: 2019  Discharge Date: 9/10/2019    :  1970   Hours Completed: 4 hours    Initial Diagnosis:  Stimulant Use Disorder, severe amphetamine type substance (F15.20)   Final Diagnosis:  Stimulant Use Disorder, severe amphetamine type substance (F15.20)   Discharge Address:    [address] [place] [address]   Funding Source:    [facility]      Discharge Type:  Against Staff Advice (ASA)    Client was receiving residential services at the time of discharge:   No       Reasons for and circumstances of service termination:  Patient seems to lack motivation for treatment or sobriety.  Pt initially relapsed while at MICD (Mental Illness Chemical Dependency) outpatient treatment at [facility].  Then he was referred to a residential treatment, [facility].  [facility] reported that patient did not come back after his weekend pass.  Pt was referred back to  to [facility] outpatient CD program.  After cancelling intakes several times he finally was admitted to the outpatient program but he lacked treatment attendance and he also had and had a positive UA.       If program discharge status was At Staff Request, the license cristina must identify the following:    Other interested parties conferred with: NA   Referrals provided: NA     Alternatives considered and attempted before deciding to discharge: NA     Dimension/Course of Treatment/Individualized Care:   1.  Withdrawal Potential - Intake Risk level - 0.  Discharge Risk level - 1.   Narrative supporting risk description:  At intake, patient reported his last use of crack as 2019.  No concern regarding withdrawal were reported by the patient. Pateint stated he \"completed\" a residential treatment at [facility].  [facility] reported to [facility] that the patient was scheduled to get discharged three days " early and left during a weekend pass.  Treatment plan goals and progress towards those goals:  Patient's treatment attendance was almost none.   During the short 4 hours of treatment he attended, patient denied any use or withdrawal issues, however patient was seen with rapid speech, disorganized thinking, facial sores were seen, etc.  He never turned in baseline UA due to lack of treatment.   UA on 9/5 was positive for amphetamine.      2.  Biomedical Conditions and Complications - Intake Risk level -  1. Discharge Risk level - 1  Narrative supporting risk description:  At intake, patient reported there was no medical issues except for his need for dental partials for his missing front teeth.  Pt states he lost them during boxing.  Pt did not have any established primary care.    Treatment plan goals and progress towards those goals:  Patient did not participate in treatment to make any progress in any area.  Patient attended only 2 CD groups, both late for total of 3 hours.  Patient is recommended to establish primary care and also consult with a dentist about his missing teeth.  Pt is recommended to abstain from using any chemicals to maintain his health.       3.  Emotional/Behavioral/Cognitive Conditions and Complications - Intake Risk level - 2. Discharge Risk level - 3.   Narrative supporting risk description:  After cancelling several intake appointments, patient declared he would arrive on 8/22 and also start group session on the same day.  Patient arrived for the intake on 8/22.  Pt stated his mother was transitioning into a senior home at [facility] and he was able to focus on his treatment.     Treatment plan goals and progress towards those goals:  Pt promised he would start group attendance on the day of the intake, but he did not show up.  Pt reported Vikas Mares conducted a brief mental health screening and suspected bipolar disorder.   Pt talked about his willing to establish  at [facility].   Patient only attended 3 hours of CD groups and he did not schedule any mental health appointment.    Of 6 CD group offered (18 hours) since intake, patient attended only 3 hours of treatment.    UA on 9/5 was positive for amphetamine.  Since patient just recently tried both MICD outpatient and residential treatment programs, he is recommended to follow the court and probation order at this time.  It will be beneficial for the patient to get a formal MH assessment one he achieve good amount of sobriety.  Patient to maintain community support group as well.       4.  Readiness for Change - Intake Risk level -  0. Discharge Risk level - 3  Narrative supporting risk description:  At intake, patient reported he was sober for about 2 months and [facility]  Patient said he was probation ordered to complete the treatment but his probation is scheduled to term very soon any way on 12/26/2019.      Treatment plan goals and progress towards those goals:  Patient lacked treatment attendance and had positive UA.  Pt has history of multiple treatment without success.  At discharge, patient was recommended to work with law enforcement/court.  Patient is not recommended for any particular CD treatment att this time since he tried multiple times and two in recent months.  Pt is recommended to seek mental health evaluation once he achieve good amount of sobriety.       5.  Relapse/Continued Use/Continued Problem Potential - Intake Risk level - 2 Discharge Risk level - 3  Narrative supporting risk description:  At intake, patient reported seeing his mother deteriorate with her health was difficult and he relapsed.  Pt reports he was relieved that his  mother would be moving into a senior assisted living home at [facility].  Pt agreed he needed more skills to stay sober for long period of time.   Treatment plan goals and progress towards those goals:  Patient had multiple  No Shows in treatment.  Pt had a positive UA for amphetamine but dit not report any use problem.  Patient was assisted with a referral to a group residential housing [facility], however he decided he did not like the place.       6.  Recovery Environment - Intake Risk level -  2 Discharge Risk level - 3  Narrative supporting risk description:  At intake, patient reported he had NA sponsor and good network of sober friends to really on.  Pt has a supportive mother as well.  Pt goes to gym to exercise and boxing.  Pt has sober activities: wood burning, oil painting, fishing, hunting, outdoor.   On probation till 12/26/2019 with [facility].       Treatment plan goals and progress towards those goals:  Patient had a very minimal treatment participation.       Strengths: established support system in  community   Needs: relapse prevention skill  Services Provided: Intake, assessment, treatment planning, education, group discussion, and recommendations at discharge.        Program Involvement: Poor  Attendance: Poor  Ability to relate in group/   Other program activities: Fair  Assignment Completion: Poor  Overall Behavior: Poor  Reported Family/Significant   Other Involvement: Fair    Prognosis: Poor      Recommendations       Maintain NA meetings and contact with sponsors.  Work with court/probation/law enforcement and follow their order on probation.      Mental Health Referral  Patient will benefit from getting a formal MH assessment once he achieve some sobriety.      Physical Health Referral:    Pt will need to establish a primary care.  Pt is recommended to consult with a dentist on his missing teeth.      Counselor Name and Title:  ORESTES Blankenship        Date:  9/10/2019  Time:  11:08 AM

## 2021-06-01 NOTE — PROGRESS NOTES
D)Patient was discharge from outpatient treatment against staff approval.  Discharge summary faxed to probation. Pt completed 4 hours of treatment.

## 2021-06-01 NOTE — PROGRESS NOTES
Weekly Progress Note  Patient Name: Gustavo Saleh   : 1970  MRN: [id number]    D) Pt attended 1/2 groups this week with one absence. Patient attended no individual sessions this week. A) Staff facilitated groups and reviewed tx progress. Assessed for [facility]. R) No VAP needed at this time.   Any significant events, defines as events that impact patient s relationship with others inside and outside of treatment: No   Indicate any changes or monitoring of physical or mental health problems:  Patient did not admit his relapse, but his UA from  was positive for amphetamine.      Indicate involvement by any outside supports: Yes. Pt reports he attends NA meetings and works with a sponsor Gastonkirsten Curiel.  Pt states he has about 15 sober friends from the meeting that he can count on.    IAPP reviewed and modified as needed.NA  Pt working on the following dimensions:    Dimension #1 - Withdrawal Potential - Risk 1.  Patient reports last use of meth reported as 2019, however his UA on  was positive for amphetamine.  This may explain why he appeared to be in manic stage.  Patient exhibited rapid speech, disorganized thoughts in group.   Patient's [prescription medication list shows only one medication, Wellbutrin.  It seems he is not on any medication that may trigger false-positive.  Patient's   Specific goals from treatment plan addressed this week:   Remain sober and comply with UA request.  If there is any issue with relapse, report to CD counselor.    Effectiveness of strategies:   Patient's UA result shows positive for amphetamine.        Dimension #2 - Biomedical - Risk 1. Pt does not report any concern for his physical health.  Pt states he goes to gym and experiences.  Pt lost front teeth due to boxing.  Pt states he wants to get partials so he can improve his self-esteem.  No established primary care.    Specific goals from treatment plan addressed this week:   Pt  to maintain his overall health and  establish primary care.    Effectiveness of strategies:   Pt is recommended to establish PCP.      Dimension #3 - Emotional/Behavioral/Cognitive - Risk 3. Pt reports he does not have formal MH diagnosis yet.  Pt states while he was in residential treatment at [facility], they conducted a brief screening and suspected Bipolar Disorder.  Pt states he wants to get DA done and start therapy and psychiatry as well if necessary.  Patient continues to miss groups.  Pt only attended one group for only one hour.   Patient is able to engage in group session, however he seemed he was under the influence.  His UA result on 9/5 was positive.  Pt met with his  on 9/5 as well before group attendance.  PO was notified of his positive UA.    Specific goals from treatment plan addressed this week: start CD group   Effectiveness of strategies: ineffective.  Pt was no show to Tuesday group and only attended Thursday group for 1 hour.      Dimension #4 - Treatment Acceptance/Resistance - Risk 3. Patient is mandated by probation to complete treatment.  Probation is scheduled to term on 12/26/19.  Positive UA on 9/5.    Specific goals from treatment plan addressed this week: Comply with treatment and probation.    Effectiveness of strategies: Ineffective. Pt is encouraged to start attending treatment on time and remain sober.      Dimension #5 - Relapse Potential - Risk 3.  Patient reports last use of meth on 6/4/2019, however his UA on 9/5 was positive for amphetamine.  [facility] and went to a [facility] residential treatment.  He came back to [facility] but he has been lacking treatment attendance.    Specific goals from treatment plan addressed this week: Developing skills for Relapse prevention.   Effectiveness of strategies: Patient is encouraged to set up a 1:1 with CD counselor to discuss his positive UA     Dimension #6 - Recovery Environment - Risk 3.  Pt  was unable to move into [facility] due to his felonies.  Pt is referred to [facility], however after having an interview and looking at the facility, patient decided he does not like the place.     Pt is mandated by probation to complete treatment but his probation will term on 12/26/2019.  Pt has established NA meetings and group of sober friends.  Pt is engaged in healthy activities such as gym boxing, fishing, oil painting, wood burning, hunting, and outdoor activities.  Patient seems actively using meth and he may need more structure.    Specific goals from treatment plan addressed this week: Maintain healthy support network.  Call Parth Downs, the manager at [facility] and set up interview.    Effectiveness of strategies: Patient is recommended to keep attending NA meetings, remain contact with his sponsors, and contact [facility].      T) Treatment plan updated: No.   Patient notified and in agreement: NA  Patient educated on: SMART Goal Setting.    Patient has completed 4 of 200 program hours at this time. Projected discharge date is 2/28/2020. Current discharge plan is TBD.     ORESTES Blankenship    Psycho-Educational Curriculum  Date Attended  Psycho-Educational Curriculum  Date Attended    Acceptance   Shame/Guilt     1st Step   Anger/Rage     Affirmations   Mental Health     Automatic Negative Thoughts   Anxiety     Cross Addiction   Co-Occurring Disorders  9/5/19   Stages of Change   Taniya/Bipolar     Relapse   Trauma      Addictive Thoughts   Victim Identity     Coping Skills   Sober Structure     Relapse Prevention   Continuum of Care     Medical Aspects   Non-12 Step Support     Brain/Neurotransmitters   Priorities     Medication Compliance   Spirituality     ODETTE Alcohol/Drug Research   Weekend Planner     Physical Health   Educational Videos     Post Acute Withdrawal   1st Step     Pregnancy and Drug Use   2nd Step     Sexual Health   Assertive Communication     Short-Term/Long-Term Effects    "My name is Mark BORDEN    Relationships   Cross Addiction     Assertive Communication   God As We Understood Him     Boundaries   HBO Relapse     Codependence    HBO What Is Addiction     Defense Mechanisms    Medical Aspects 1     Family Roles   Medical Aspects 2     Goodbye Letter   [employer]: Stress     Intimacy   PBS Depression Out of the Shadows     Socialization Skills   [place]     Feelings   Chris Guzmán \"Highjacked Brain\"    ABC Model of Emotion   Piyush Fregoso Humor in Tx    Grief and Loss   The Mindfulness Movie    Healthy vs. Unhealthy Feelings   Mark BORDEN documentary     Meditation/Mindfulness   Recreational/sober activities     Overconfidence/Complacency   SMART Goal setting  8/29/19    Resentments       Stress           "

## 2021-06-14 NOTE — PROGRESS NOTES
"Intake Note:   D) Gustavo Saleh Jr. is a 47 y.o.   White or  male who is referred to  [facility] IOP via [facility] with funding from [place]. Patient orientated x 3. Patient meets criteria for Methamphetamine Use Disorder-Severe (F15.20).  Patient appears appropriate for [facility] IOP. .   A) Met with patient for 62 minutes.  Completed intake assessment and preliminary paperwork. Patient was given and explained counselor & supervisor license number and contact info, Patient Bill of Rights, program rules/regulations, Program Abuse Prevention Plan, confidentiality & HIPPA policies, grievance procedure, presented ROIs, TB & HIV/AIDS policies & resources, and Vulnerable Adult policy.   Conducted Vulnerable Adult Assessment.   R)No special Vulnerable Adult needed at this time.  Patient signed and agreed to counselor & supervisor license number and contact info., Patient Bill of Rights, group rules/regulations, Program Abuse Prevention Plan, confidentiality & HIPPA policies, grievance procedure,  ROIs, TB & HIV/AIDS policies & resources, and Vulnerable Adult policy. Patient scored 2.8 on PANSI screen. Patient denied suicidal ideation/intent/plan/means at this time.     Opioid Use Disorder: No   Provided \"Options for Opioid Treatment in [place] and Overdose Prevention\" No     Dimension #1 - Withdrawal Potential - Risk 0. Pt reports last chemical (methamphetamine) use on 11/9/17. No current w/d concerns.     Dimension #2 - Biomedical - Risk 0. No current concerns.     Dimension #3 - Emotional , Behavioral and Cognitive - Risk 1. Pt reports prior MH diagnoses of Depression, Anxiety, ADHD and OCD. No current MH services.      Dimension #4 - Treatment Resistance - Risk 1. Pt is mandated to CD Tx though is motivated to engage.     Dimension #5 - Relapse Potential - Risk 3. Pt reports long history of chemical use, treatment, sobriety and relapse. Pt reports 12 prior treatment episodes, some incomplete. Pt unable to " identify specific relapse-prevention strategies or tools he finds useful.     Dimension #6 - Recovery Environment - Risk 3. Pt currently on probation in [facility]. Pt reports long legal history that hinders his ability to secure employment; Pt is currently unemployed and lives with his mother whom he reports is very sick.     T) Explained counselor & supervisor license number and contact info, Patient Bill of Rights, program rules/regulations, Probation Abuse Prevention Plan, confidentiality & HIPPA policies, grievance procedure, presented ROIs, TB & HIV/AIDS policies & resources, and Vulnerable Adult policy. Patient expected to start group on Monday, 12/11/17.      ORESTES Summers  12/5/2017, 3:31 PM
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
## 2021-06-14 NOTE — PROGRESS NOTES
Gustavo Saleh Jr. attended 3 hours of group therapy today.    Pt shared that he is taking his sobriety seriously and reviewing the people and behaviors in his life that he will need to establish boundaries around in order to remain sober. Pt states he is committed to conducting himself honestly.     12/19/2017 1:32 PM David Barron

## 2021-06-14 NOTE — PROGRESS NOTES
Gustavo Saleh Jr. attended 3 hours of group therapy today.    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 Pt expressed concern that his work-partner still uses methamphetamine. Pt was encouraged to focus on his own safety and how he can stay connected to a recovery support network while working this job.    12/12/2017 12:43 PM David Barron

## 2021-06-14 NOTE — PROGRESS NOTES
Weekly Progress Note  Gustavo Saleh Jr.  1970  [id number]      D) Pt attended 2 groups this week with 2 absences. Patient attended 0 individual sessions this week. A) Staff facilitated groups and reviewed tx progress. Assessed for [facility]. R) No VAP needed at this time.     Any significant events, defines as events that impact patients relationship with others inside and outside of treatment Yes: Pt is reported to have offered IOP peers discounted gift cards in violation of [facility] rules. 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   Indicate any changes or monitoring of physical or mental health problems No    Indicate involvement by any outside supports Yes: Pt reports continuing NA attendance.   IAPP reviewed and modified as needed. No  Pt working on the following dimensions:    Dimension #1 - Withdrawal Potential - Risk 0. Pt reports last methamphetamine use 11/9/17. No current withdrawal symptoms. .  Specific goals from treatment plan addressed this week:  Abstain from mood-altering substances.   Effectiveness of strategies:  As of 12/12 Pt reports continuing abstinence from mood-altering substances. Pt has not taken UA as directed by counselor on 12/13 due to absence from [facility].     Dimension #2 - Biomedical - Risk 2. Pt called into group on 12/13 reporting lesions on his face that he planned to see a medical professional about.   Specific goals from treatment plan addressed this week:  Address medical concerns as needed.  Effectiveness of strategies:  Pt indicated on VM to counselor on 12/13 that he planned to seek medical attention to address painful lesions on his face.     Dimension #3 - Emotional/Behavioral/Cognitive - Risk 2. Pt reports no increase in MH concerns though presents as hyper-aroused, speaking quickly with difficulty concentrating or maintaining focus.  ".  Active interventions to stabilize mental health symptoms:  None.  Specific goals from treatment plan addressed this week:  Stabilize mental health concerns.  Effectiveness of strategies:  Pt has not identified any specific tools or techniques with which to address his focus, concentration, or identified fear/anxiety stemming from his current employment limitations.     Dimension #4 - Treatment Acceptance/Resistance - Risk 3. Pt is mandated to IOP through [facility] though expressed motivation to abstain from mood-altering substances, participate in treatment and develop a recovery support network. This noted, Pt has missed 2 of 4 days of his first week of IOP. Pt has admitted in treatment group that he continues to engage in \"dirt on the street to get by\" and has reportedly offered discounted gift cards to program peers. 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   Specific goals from treatment plan addressed this week:  None  Effectiveness of strategies:  Pt has not identified any specific strategies to address motivation or increased program or recovery engagement over the past week.     Dimension #5 - Relapse Potential - Risk 3. Pt reports long history of chemical use, treatment, sobriety and relapse. Pt reports 12 prior treatment episodes, some incomplete. Pt unable to identify specific relapse-prevention strategies or tools he finds useful.   Specific goals from treatment plan addressed this week:  N/A  Effectiveness of strategies:  Due to inconsistent program attendance over his first week enrolled in [facility], it is unclear what specific relapse-prevention strategies Pt is employing. 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     Dimension #6 - " "Recovery Environment - Risk 3. Pt currently on probation in [facility]. Pt reports long legal history that hinders his ability to secure employment; Pt is currently unemployed and lives with his mother whom he reports is very sick.   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 Pt has admitted in treatment group that he continues to engage in \"dirt on the street to get by.\"    T) Treatment plan updated yes.  Patient notified and in agreement NA. - Pt has not yet met with counselor to review Tx Plan.    Patient educated on Recovery Support. Patient has completed 6 of 108 program hours at this time.     Projected discharge date is 3/16/18. Current discharge plan is PENDING.     ORESTES Summers  10/27/2017, 2:44 PM      Weekly Educational Topics Date Education   1. Understanding Dual Diagnoses, week 1     2. Understanding Dual Diagnoses, week 2     3. Stress Management     4. Feelings/Emotions     5. Thinking     6. Building Recovery Support 12/11/17, 12/12/17 Mindfulness Meditation; VIDEO: Everything You Think You Know About Addiction is Wrong; 3 Circles exercise;    7. Developing Assertive Communication Skills     8. Relapse Prevention     9. Relationships/Boundaries     10. Grief and Loss     11. Strengths     12. Wellness     Seeking Safety Unit every Tuesday     Mindfulness every Friday            "

## 2021-06-14 NOTE — PROGRESS NOTES
Weekly Progress Note  Gustavo Saleh   1970  [id number]      D) Pt attended 2 groups this week with 1 excused absence. Patient attended 0 individual sessions this week. A) Staff facilitated groups and reviewed tx progress. Assessed for [facility]. R) No VAP needed at this time.     Any significant events, defines as events that impact patients relationship with others inside and outside of treatment No.   Indicate any changes or monitoring of physical or mental health problems No    Indicate involvement by any outside supports Yes: Pt reports continuing NA attendance.   IAPP reviewed and modified as needed. No  Pt working on the following dimensions:    Dimension #1 - Withdrawal Potential - Risk 0. Pt reports last methamphetamine use 11/9/17. No current withdrawal symptoms. .  Specific goals from treatment plan addressed this week:  Abstain from mood-altering substances.   Effectiveness of strategies:  Pt reports continuing abstinence from mood-altering substances. UA on 12/18 shows NEG for all mood-altering substances.      Dimension #2 - Biomedical - Risk 1. Pt provided urgent care documentation from visit 12/15 showing treatment for a bacterial infection.  Specific goals from treatment plan addressed this week:  Address medical concerns as needed.  Effectiveness of strategies:  Pt provided urgent care documentation from visit 12/15 showing treatment for a bacterial infection.    Dimension #3 - Emotional/Behavioral/Cognitive - Risk 2. Pt reports no increase in MH concerns though presents as hyper-aroused, speaking quickly with difficulty concentrating or maintaining focus. .  Active interventions to stabilize mental health symptoms:  Assertive Communication.  Specific goals from treatment plan addressed this week:  Stabilize mental health concerns.  Effectiveness of strategies:  Pt used group time to discuss and seek clarification around assertive communication skills and began planning for the Christmas holiday  "when he expects to see some extended family who remain upset with him.     Dimension #4 - Treatment Acceptance/Resistance - Risk . Pt is mandated to IOP through [facility] probation though expressed motivation to abstain from mood-altering substances, participate in treatment and develop a recovery support network. Pt has agreed to cease all illegal or otherwise dishonest activities while participating in IOP and has verbalized understanding of his inability to maintain recovery while still \"doing dirt.\"   Specific goals from treatment plan addressed this week:  Develop and maintain momentum for sobriety and recovery.  Effectiveness of strategies:  Pt has participated in group topic discussions around Developing Assertive Communication Skills and has identified areas in his daily life where these skills can be helpful.      Dimension #5 - Relapse Potential - Risk 3. Pt reports long history of chemical use, treatment, sobriety and relapse. Pt reports 12 prior treatment episodes, some incomplete. Pt unable to identify specific relapse-prevention strategies or tools he finds useful.   Specific goals from treatment plan addressed this week:  Attend weekly NA meetings and enlist support of a sponsor.   Effectiveness of strategies:  Pt reports attending NA and [place] over the past week. Pt has agreed to enlisting an NA sponsor no later than 01/01/2018.    Dimension #6 - Recovery Environment - Risk 3. Pt currently on probation in [facility]. Pt reports long legal history that hinders his ability to secure employment; Pt is currently unemployed and lives with his mother whom he reports is very sick.   Specific goals from treatment plan addressed this week:  Engage employment resources.  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 Pt " shared he met a friend through NA who may hire him at [employer].     T) Treatment plan updated no.  Patient notified and in agreement NA. -     Patient educated on Developing Assertive Communication Skills. Patient has completed 12 of 108 program hours at this time.     Projected discharge date is 3/16/18. Current discharge plan is PENDING.     David Barron [facility]  10/27/2017, 2:44 PM      Weekly Educational Topics Date Education   1. Understanding Dual Diagnoses, week 1     2. Understanding Dual Diagnoses, week 2     3. Stress Management     4. Feelings/Emotions     5. Thinking     6. Building Recovery Support 12/11/17, 12/12/17 Mindfulness Meditation; VIDEO: Everything You Think You Know About Addiction is Wrong; 3 Circles exercise;    7. Developing Assertive Communication Skills 12/18/17, 12/19/17 Mindfulness Meditation, Assertiveness Worksheet, TEDx: How to Speak Up for Yourself;    8. Relapse Prevention     9. Relationships/Boundaries     10. Grief and Loss     11. Strengths     12. Wellness     Seeking Safety Unit every Tuesday     Mindfulness every Friday

## 2021-06-14 NOTE — PROGRESS NOTES
Individual Treatment Plan    Patient  Name: Gustavo Saleh Jr.  MRN: [id number]   : 1970  Admit Date: 17  Date of Initial Service Plan: 17  Tentative Discharge Date: 2018    Counselor: DEBORAH Summers, [facility]      Dimension 1: Acute Intoxication/Withdrawal Potential, Risk level: 0    Problem: Pt reports last methamphetamine use 17. No current withdrawal symptoms.   Goal: Maintain abstinence from all non-prescribed mood-altering substances.   Must be reached to complete treatment? Yes  Methods/Strategies (must include amount and frequency): Attend all schedule IOP groups: Monday, Tuesday, Wednesday and Friday, 9:30am-12:30pm.   Target Date: 2018  Completion Date:         Dimension 2: Biomedical Conditions/Complications, Risk level: 0    Problem: Surgery: left arm 2017; Surgery on left knee 2015; Broken Jaw in . No current physical/biomedical concerns.   Goal: Maintain physical health.   Must be reached to complete treatment? No  Methods/Strategies (must include amount and frequency): Seek medical services as needed.   Target Date: 2018  Completion Date:         Dimension 3: Emotional/Behavioral/Cognitive, Risk level: 1    Problem: Pt reports prior diagnoses of Depression, Anxiety, Attention Deficit Hyperactivity Disorder, and Obsessive Compulsive Disorder.  Goal: Establish and maintain mental and emotional health.  Must be reached to complete treatment? Yes  Methods/Strategies (must include amount and frequency): Follow counselor referral to engage individual therapy.  Target Date: 2018  Completion Date:     Problem: Brief DA shows current Dx of Major Depressive Disorder, Mild, Single episode. Pt not currently prescribed psychotropic medications.  Goal: Explore medication for symptoms of depression.  Must be reached to complete treatment? No  Methods/Strategies (must include amount and frequency): Schedule medical appointment with general  "practitioner through [facility]. Explore psychotropic medications.   Target Date: 01/05/2018  Completion Date:         Dimension 4: Readiness to Change, Risk level 1    Problem: Pt is mandated to IOP through [facility] though expressed motivation to abstain from mood-altering substances, participate in treatment and develop a recovery support network.   Goal: Develop and maintain momentum for sobriety and recovery.   Must be reached to complete treatment? Yes  Methods/Strategies (must include amount and frequency): Attend weekly NA meetings and enlist support of a sponsor.   Target Date: 01/05/2018  Completion Date:         Dimension 5: Relapse/Continued Use/Continued Problem Potential, Risk level: 3    Problem: Pt unable to identify specific relapse-prevention strategies or tools he finds useful.   Goal: Develop a \"toolbox\" of practical, effective relapse-prevention strategies and tools.  Must be reached to complete treatment? Yes  Methods/Strategies (must include amount and frequency): Attend weekly NA meetings and enlist support of a sponsor.   Target Date: 01/05/2018  Completion Date:     Problem: Pt unable to identify specific relapse-prevention strategies or tools he finds useful.   Goal: Develop a \"toolbox\" of practical, effective relapse-prevention strategies and tools.  Must be reached to complete treatment? Yes  Methods/Strategies (must include amount and frequency): Pt will develop a \"break glass\" safety plan, listing 5 things he will do in the event of beginning a chemical or behavioral relapse.  Target Date: 01/05/2018  Completion Date:         Dimension 6: Recovery Environment, Risk level: 3    Problem: Pt reports limited recovery support network.  Goal: Develop and engage a widespread, redundant recovery support network.  Must be reached to complete treatment? Yes  Methods/Strategies (must include amount and frequency): Attend weekly NA meetings and enlist support of a sponsor. "   Target Date: 01/05/2018  Completion Date:     Problem: Currently unemployed.  Goal: Engage employment resources.  Must be reached to complete treatment? No  Methods/Strategies (must include amount and frequency): Visit [facility] center.  Target Date: 01/05/2018  Completion Date:         Resources  Resources to which the patient is being referred for problems when problems are to be addressed concurrently by another provider: NA      By signing this document, I am acknowledging that I was actively and directly involved in the development of my treatment plan.           Patient  Signature_________________________________________         Date__________________        Staff Signature  David Barron [facility]    Date: 12/11/2017, 9:02AM

## 2021-06-14 NOTE — PROGRESS NOTES
Pt has missed 2 of 4 [facility]D IOP treatment groups during his first scheduled week of treatment. Pt's case has been staffed with counselor peers and it has been determined that Pt will be presented with a Tx program compliance contract before his next scheduled group on 12/18/17. The compliance contract will stipulate the following:    -Pt will cease any and all criminal and/or illegal activity outside of treatment that inhibits his ability to be honest in all life areas.   -Pt will agree to be placed on a color-wheel UA schedule through his [facility]  to verify abstinence from mood-altering chemicals as UA screenings conducted at [facility] are not observed.   -Pt will agree to and sign Tx compliance contract stipulating consistent attendance at [facility] and contact with counselor if unable to attend.   -Pt will provide counselor with verification of medical services sought on 11/13/17 requiring his absence from [facility].

## 2021-06-14 NOTE — PROGRESS NOTES
Gustavo Saleh Jr. attended 3 hours of group therapy today.    Pt attended first [facility] IOP group. Pt introduced himself and participated in group discussion around Building Recovery Support.     12/11/2017 12:45 PM David Barron

## 2021-06-14 NOTE — PROGRESS NOTES
Addiction Services - Initial Services Plan   Name:Gustavo Saleh Jr.   : 1970   MRN: [id number]       Patient describes their immediate need: To learn sober living skills to prevent relapse.     Are there any immediate Safety Needs such as (physical, stability, mobility): NO Pt is able to get medical care as needed. Pt denies immediate concerns.     [id number] Health Needs and Plan:   Remain clean and sober and attend first group therapy session on Monday, 17.    Vulnerable Adult: No     [ ] Continue Current Medications for:    [ ] Request Consult for:   [ ] Notify Attending Physician about:   [ ] Other:   Issues to be addressed i the first sessions:   Treatment planning, orientation to group norms and rules, and welcomed by peers.     Patient strengths and needs:   Strengths identified as Prior NA/CMA engagement; Prior periods of sobriety.  Needs identified as Employment; MH services; Recovery support.    Plan for patient for time between intake and completion of the treatment plan:   Attend all group therapy sessions as directed, complete all written and oral assignments as directed, and remain clean and sober. A relapse, if any, must be reported to staff immediately in order to ensure you are receiving the proper level of care. If you cannot attend a group therapy session you must call contact information provided in intake folder and leave a message before or during group hours.     Staff Members' Titles authorized to Initiate Services are:   Director of Behavioral Service   Clinical Director of Chemical Dependency   [facility] Counselor   MI/CD Counselor        Vulnerable Adult Review   [X] Review of the facility Abuse Prevention plan was reviewed with the patient   [X] No individual abuse plan is necessary   [ ] In addition to the facility Abuse Prevention plan, an Individual Abuse Plan will be put in place       Staff Name/Title: David Barron [facility] Date: 2017   Time: 3:37 PM

## 2021-06-14 NOTE — PROGRESS NOTES
Gustavo Saleh Jr. attended 1 hour of group therapy today.    Pt reported learning of his mother's diagnosis of terminal cancer this morning. Pt presents as upset.     12/22/2017 12:19 PM David Barron

## 2021-06-15 NOTE — PROGRESS NOTES
[facility] Updated Assessment    Date: 1/3/2018   : David Barron      Name: Gustavo Saleh Jr.        Address: [address] [place] [address]  : 1970  Age: 47 y.o.   Race: White or    Marital Status:not   Phone: [phone] (home)   SS#: [id number]  Referral Source: [facility]/David Barron, MSW, [facility], [facility]      [facility]   Service Requested: IP (i.e. IP, OP, Assess, etc.)  Employment: Currently Unemployed.  Health Insurance: [email or web address] [facility]  Income Verified: Yes      Dimension I Acute intoxication/Withdrawal potential    Symptomology (past 12 months): (delete those that do not apply)  Increased tolerance, passing out, binges, AM use, weekly intoxication, decreased tolerance, blackouts, secretive use, preoccupation, protecting supply, hurried ingestion, medicinal use, using alone, repeated family or social problems, mood swings, loss of control, family history of addiction    Observed or reported (withdrawal symptoms): (delete those that do not apply)  Sweating, Psychomotor agitation, Depression, Fever, Muscle aches, Goose flesh, Delirium, Fatigue, Headache, Vivid/Unpleasant dreams, Increased hand tremor, Anxiety, Tearing & runny nose, Diarrhea, Dizziness, GI upset, Irritability, Increased appetite, Insomnia, Loss of appetite, Nausea/vomiting, Dilated pupils, Yawning, Agitation, Seizures, Psychosis.  Drug Last Use Amount Frequency Route   Alcohol 2016 12 cans or more; age 19: almost daily vodka 1+ pint; Reduced to 1 x wk 3 beers + 1-2 shots vodka Weekends  Oral   Marijuana 2005 NA Daily Smoke   Cocaine/Crack  NA For 6 months - binges; later few times a year Smoke   Opiates/Herion 2017 Rx 2015-Sep 2017; later $40-$50/day off street Daily Oral   Hallucinogens NA NA NA NA   Sedatives/Benzos NA NA NA NA   Amphetamine/Meth 18 $300/wk Binges lots of meth in few days Smoke   Inhalants NA NA NA NA   Other  01/03/2018 Cigarettes, 1 ppd Daily Smoke     Dimension I Risk Rating :  1    Reason Risk Rating Assigned: Pt reports heavy methamphetamine use over the weekend of 12/29/17-01/01/2018. Pt reports some fatigue.      Dimension II Biomedical Conditions    Any known health conditions: Yes  Is use related to health problem/concern: No  MD documents physical deterioration due to use: No  List Health Concerns/Conditions: Surgery: left arm March 2017; Surgery on left knee June 2015; Broken Jaw in 1999. Pruritic Rash and Cellulitis of Chest Wall on 12/15/17.     Dimension II Risk Rating :  0    Reason Risk Rating Assigned: No current concerns.                   Dimension III Emotional/Behavioral/Cognitive    Oriented to: person, place, time and situation  Current Mental Health Services: No  Hospitalization for MH or psychiatric problems: No  How many Hospitalizations: 0  Last Hospitalization; date and location:NA   Diagnoses: Depression, Anxiety, Attention Deficit Hyperactivity Disorder, and Obsessive Compulsive Disorder by Pt report; Major Depressive Disorder, Mild, Single episode (F32.0) per brief DA on 12/5/17.   Psychiatrist: None       Clinic: NA  Current Medications: None   Taking medications as prescribed: NA     Medications Helpful: NA  Current Suicide ideation: No  If yes, any plan? What is plan?: NA  Previous Suicide Attempts? (explain): No  Made Referral to MH Center: No    Dimension III Risk Rating :  1    Reason Risk Rating Assigned: Pt denies history of trauma/abuse. Pt expresses skepticsim around psychotropic medications and mental health services. Pt attributes self-reported anxiety symptoms to his legal situation and the deteriorating health of his mother, whom Pt lives with.                     Dimension IV Readiness to Change    Mandated, or coerced into assessment or treatment:  Yes  Does client feel there is a problem:  Yes  Verbalization of need/desire to change: Yes  Impression of : (delete  those that do not apply)  Cooperative, genuinely motivated, ambivalent about change, minimal awareness.    Dimension IV Risk Rating :  2    Reason Risk Rating Assigned: Pt was recently discharged from MIC[facility] IOP at [facility] due to inconsistent attendance and strong suspicion of continuing chemical use that was verified by program peers. Pt was referred to OP through [facility] though contacted counselor on  with request for referral to IP CD Tx through [facility] and acknowledged recent methamphetamine use over the past weekend.                   Dimension V Relapse/Continued Use/Continued Problem Potential    Lifetime # of CD Treatments: 13  List program, dates, and status of completion: 2017: [facility] MICD IOP: Incomplete; : [facility]CDTX at [place]: Complete; : [facility]; : residential;     Dimension V Risk Ratin    Reason Risk Rating Assigned: Pt reports long history of chemical use, treatment, sobriety and relapse. Pt reports 13 prior treatment episodes, some incomplete. Pt is unable to identify specific relapse-prevention strategies or tools he finds useful though notes NA meetings to be helpful. Pt appears to minimize his chemical use and demonstrates limited insight into motivations and/or stressors beyond immediate legal concerns. Pt does not appear to possess skills/tools with which to arrest or reduce chemical use at this time.                   Dimension VI Recovery Environment    Sober Family support:  Yes  Sober friend support: Yes  Connected to sober support network: Yes  Current living circumstances: Pt lives with his mother whom he reports is terminally ill.  Environment supportive of recovery: No  Spiritual/Anabaptist needs: No   Cultural needs: No   Family history of CD/MH issues: Yes  Family size: 2          Number of children: 0  Current Legal Concerns: Yes explain: Pt currently on probation through [facility], likely facing several months skilled nursing time.    Pregnancy Concerns: N/A  Mothers First Contacted: N/A  Child Protection Involvement: N/A  CP worker Name: KATIE       Phone: NA    Dimension VI Risk Rating :  3    Reason Risk Rating Assigned: Pt currently on probation in [facility]. Pt reports long legal history that hinders his ability to secure employment. Pt is currently unemployed and lives with his mother whom he reports is very sick. Pt reports periodic NA attendance.

## 2021-06-15 NOTE — PROGRESS NOTES
"Pt missed requested UA on 12/22/17 and was absent from IOP on 12/26/17. An IOP peer ([phone]) reports Pt used methamphetamine with her on 12/20/17 and disclosed to her that he was \"coming down\" on 12/22. This peer also states Pt continued to call her until her mother took the phone and directed Pt not to call again. Counselor has communicated with Pt's [facility]  who indicates she will begin mandating observed UA tests from Pt. Pt has expressed frustration to counselor regarding the expectation that he discontinue \"dirt,\" or illegal activity for income while participating in IOP yet he will be required to negotiate logistics around working, attending Tx, and taking UA's as directed by his PO. Pt stated his is considering voluntarily executing the remainder of his sentence. Pt requested a referral to a different CD Tx program that focuses on working with clients heavily involved in the criminal justice system and will be referred to [facility] in [facility] CD Tx. Pt will be discharged from [facility] today. PO has been notified.   "

## 2021-06-15 NOTE — PROGRESS NOTES
[facility]  DISCHARGE SUMMARY            NAME:  Gustavo Saleh Jr.   Physician:  KATIE   MRN:  [id number] :  David Barron   #:  [id number] Funding Source:  [facility]   Admit Date: 17 Discharge Date: 2017     :  1970 Hours Completed: 14   Initial Diagnosis:  Methamphetamine Use Disorder-Severe (F15.20) Final Diagnosis:  Methamphetamine Use Disorder-Severe (F15.20)   Discharge Address:    [address] [place] [address]      Discharge Type:  At Staff Request (ASR)    Reasons for and circumstances of service termination:  Gustavo Saleh Jr. is a 47 y.o. year-old male who admitted to [facility] IOP on 17 and has completed 14 hours as of 17. Pt received information on Building Recovery Support and Developing Assertive Communication Skills.     Dimension/Course of Treatment/Individualized Care:   1.  Withdrawal Potential - Risk level - 0: At time of admit Pt reported last methamphetamine use 17. Pt tested NEG for all mood-altering substances on 17.      2.  Biomedical Conditions and Complications - Risk level - 1: At time of admit Pt reported surgery: left arm 2017; Surgery on left knee 2015; Broken Jaw in . Pt reported lesions on his face that required medical care and reportedly resulted from a bacterial infection.      3.  Emotional/Behavioral/Cognitive Conditions and Complications - Risk level - 1: At time of admission Pt reported prior diagnoses of Depression, Anxiety, Attention Deficit Hyperactivity Disorder, and Obsessive Compulsive Disorder. Not currently receiving any mental health services nor currently prescribed any psychotropic medications. Pt denied history of trauma/abuse. On brief diagnostic assessment administered on date of admission Pt met criteria for Major Depressive Disorder, Mild, Single episode. Pt reports considerable grief around putting his mother through stress by his long history of chemical use and  "criminal activity.      4.  Treatment Acceptance/Resistance - Risk Level - 3: Pt displayed inconsistent attendance during brief participation in IOP and struggled to follow group guidelines around feedback and cross-talk. Pt reported continuing illegal activity which he characterized as \"dirt\" while in Tx, despite verbalizing an understanding that such activity is inconsistent with long-term recovery. Pt reported periodic NA attendance though expressed ambivalence around securing a sponsor for accountability. Pt was also suspected of continuing chemical use and offering methamphetamine to at least one IOP peer.       5.  Relapse/Continued Use/Continued Problem Potential - Risk level - 3: Pt reported long history of chemical use, treatment, sobriety and relapse. Pt reported 12 prior treatment episodes, some incomplete. Pt was unable to identify specific relapse-prevention strategies or tools he finds useful. Pt attributes his ongoing chemical use to longstanding criminal activity and criminal justice involvement.      6.  Recovery Environment - Risk level - 3: Pt currently on probation in [facility]. Pt reported long legal history that hinders his ability to secure employment. Pt is currently unemployed and lives with his mother whom he reports is very sick. Pt reported periodic NA attendance.      Strengths: Resilience;  Needs:  Cog Skills around criminal thinking; Possible individual psychotherapy.   Services Provided:  Group Therapy; Psychoeducation; Referral;            Program Involvement: Poor  Attendance: Poor  Ability to relate in group/   Other program activities: Poor  Assignment Completion: NA  Overall Behavior: Poor  Reported Family/Significant   Other Involvement: NA    Prognosis: Poor      Recommendations       Attend 12 Step Meetings, Obtain/Retain 12 Step Program Sponsor, Discharged to Other CD Services, Identify and Maintain a Sober Social and Network of Friends    Mental Health " Referral  NA      Physical Health Referral:  Personal Physician                Counselor Name and Title:  David Barron        Date:  12/27/2017  Time:  1:30 PM

## 2021-06-16 PROBLEM — F15.20 METHAMPHETAMINE USE DISORDER, SEVERE (H): Status: ACTIVE | Noted: 2018-01-11

## 2021-06-16 PROBLEM — F43.23 ADJUSTMENT DISORDER WITH MIXED ANXIETY AND DEPRESSED MOOD: Chronic | Status: ACTIVE | Noted: 2018-01-18

## 2021-06-16 PROBLEM — Z86.59 HISTORY OF ADHD: Chronic | Status: ACTIVE | Noted: 2018-01-18

## 2021-06-16 NOTE — PROGRESS NOTES
"D. Counselor met with patient for 55 minutes to discuss treatment progress. A. Counselor presented UA results to patient. Counselor inquired as to what has been going on with patient. Counselor encouraged patient to look at what led up to the use, particularly thinking and emotions, and to consider what needs to change. Counselor encouraged patient to be aware of his thinking, particularly related to where he thinks he \"should be.\" Counselor also discussed not trying to rush the recovery process and accepting where he is right now.  Counselor gave patient assignment of Behavior Chain Analysis/SLIP worksheets and to identify what he likes about recovery. R. Upon seeing results of UA, patient reported that he has used the previous week for two days, and had expected the UA to be positive for meth. Patient reported that multiple stressful events happened last week and that he felt that he couldn't handle things without using. Patient reported a desire to be sober, but reported that so much of what he is experiencing now feels vulnerable and unknown. Patient reported legal concerns and his mothers health have complicated his desire to be abstinent. Patient reported wanting to work, but is unsure if this is the right thing. Patient reported willingness to share in group about the use and try to be more honest about how he is doing. Patient also reported wanting to see a therapist. T. Counselor to update patient's treatment plan to reflect additional assignments and check into therapist availability in [facility].         TATIANNA Valera, ORESTES  3/16/2018, 2:41 PM        "

## 2021-06-16 NOTE — PROGRESS NOTES
Individual Treatment Plan--Update    Patient  Name: Gustavo Saleh Jr.  MRN: [id number]   : 1970  Admit Date: 18  Date of Initial Service Plan: 18  Tentative Discharge Date: 18    Counselor: TATIANNA Valera LADC      Dimension 1: Acute Intoxication/Withdrawal Potential, Risk level: 0     Problem: None noted  Ct reports OLYA of any substance as 18  Goal: Patient to maintain abstinence throughout outpatient treatment.   Must be reached to complete treatment? Yes  Methods/Strategies (must include amount and frequency):   1. Patient to report any substance/alcohol use to counselor to determine if any changes need to be made to address withdrawal symptoms.   2. Patient to complete any requested UAs.   Target Date: 18  Completion Date:        Dimension 2: Biomedical Conditions/Complications, Risk level: 0     Problem: Ct reports being in good health, no medical conditions noted at the time of Intake.  Ct reports need to secure PCP.  Goal: Patient to maintain stable health throughout outpatient treatment.   Must be reached to complete treatment? No  Methods/Strategies (must include amount and frequency):   1. Patient to report any changes to physical health to counselor.   2. Patient to take medications as prescribed.   Target Date: 18  Completion Date:   1. Patient will scheduled doctor s appointment and establish PCP.   Target Date: 3/30/18  Completion Date:        Dimension 3: Emotional/Behavioral/Cognitive, Risk level: 2     Problem: Ct reports ADHD and Adjustment Disorder.  Ct states that he struggles with focus and slowing down his thoughts.  Goal: Patient to build coping skills to address mental health symptoms  Must be reached to complete treatment? No  Methods/Strategies (must include amount and frequency):   1. Patient to begin using coping skills learned in therapeutic group (such as grounding, breathing, thought challenging, mindfulness, etc), and share in daily  check-in any benefits or challenges that you experience using these skills.  Target Date: 5/30/18  Completion Date:   2. Patient to attempt mindfulness practice at least 1x per week. Share with counselor/group any benefits or challenges you experience.  Target Date: 3/30/18  Completion Date:           Dimension 4: Readiness to Change, Risk level 1     Problem: Ct admits significant criminal justice involvement overlying his continued drug use.  Goal: Patient to increase motivation towards recovery by participating in outpatient programming.   Must be reached to complete treatment? Yes  Methods/Strategies (must include amount and frequency):   1. Patient to attend 4, 3-hour groups per week and all scheduled 1:1s.  2. Patient will contact staff if unable to attend  - Vianca [phone], Stephy [phone]  Target Date: 5/30/18  Completion Date:   1. Patient to identify 3 goals for his life and identify how staying sober and not staying sober will impact reaching these goals. Share with counselor.   Target Date: 3/30/18  Completion Date:          Dimension 5: Relapse/Continued Use/Continued Problem Potential, Risk level: 2     Problem: Ct states that he has maintained sobriety for 19 years at one point, but relapsed when faced with his sister's and father's death.  Goal: Develop coping skills and relapse prevention strategies to utilize when experiencing triggers, cravings and/or urges to use illegal drugs or drink.  Must be reached to complete treatment? Yes  Methods/Strategies (must include amount and frequency):   1. Patient to share in daily check-in any urges and addictive thinking to better understand his pattern of use and to prevent relapse in the future.   Target Date: 5/30/18  Completion Date:   1. Patient to identify 5 things that need to change since the last time recovery was attempted. Share with counselor.   Target Date: 3/30/18  Completion Date:          Dimension 6: Recovery Environment, Risk level:  2     Problem: Ct has 12 felonies, lives with his mother who he reports as terminally ill, and is unemployed  Goal: Comply with probation case plan and remain law-abiding.  Must be reached to complete treatment? Yes  Methods/Strategies (must include amount and frequency):   1.  Make a list of current behaviors and relationships that either support or harm your recovery.  Target Date: 3/16/18  Completion Date:   1.  Attend weekly scheduled meetings with PO.   Target Date: 5/30/18  Completion Date:     Problem: Patient reports he is building relationships in the recovery community.   Goal: Patient to continue to build support.   Must be reached to completed treatment? Yes  Methods/Strategies (must include amount and frequency):   1. Patient to attend at least 2 recovery meetings per week. Share with group/counselor any benefits or challenges you experience.   Target Date: 5/30/18  Completion Date:         Resources  Resources to which the patient is being referred for problems when problems are to be addressed concurrently by another provider: Patient reports current services through [facility].       By signing this document, I am acknowledging that I was actively and directly involved in the development of my treatment plan.           Patient  Signature_________________________________________         Date__________________        Staff Signature  TATIANNA Valera, [facility]    Date: 3/2/2018, 8:37 AM

## 2021-06-16 NOTE — PROGRESS NOTES
Gustavo Saleh Jr. attended 2 hours of group therapy today. Patient left after 2nd hour, did not inform staff.     3/14/2018 2:46 PM Stephy Sanz

## 2021-06-16 NOTE — PROGRESS NOTES
Weekly Progress Note  Gustavo Saleh  1970  [id number]      D) Pt attended 4/4 groups this week, but left group or arrived late on 3 days. Patient attended 1 individual session.  A) Staff facilitated groups and reviewed tx progress. Assessed for [facility]. R) No VAP needed at this time.     Any significant events, defines as events that impact patients relationship with others inside and outside of treatment: No  Indicate any changes or monitoring of physical or mental health problems: No  Indicate involvement by any outside supports: Yes: patient reports recovery group attendance  IAPP reviewed and modified as needed: NA    Pt working on the following dimensions:  Dimension #1 - Withdrawal Potential - Risk 0. Patient reports at intake date of last use on 1/10/18.  Specific goals from treatment plan addressed this week:  Patient was requested to complete a UA on 3/14/18, which was positive for Amphetamines. When the UA result was reviewed with patient, patient reported that he used meth on 3/8/18 and 3/9/18.  Effectiveness of strategies:  Strategies appear effective.   Dimension #2 - Biomedical - Risk 0. Patient reports no current health concerns and has access to medical care.   Specific goals from treatment plan addressed this week:  Patient reported that he is recovering from a skin rash.    Effectiveness of strategies:  Strategies appear effective.   Dimension #3 - Emotional/Behavioral/Cognitive - Risk 2. Patient's medical record indicates diagnoses of adjustment disorder and ADHD (by history).  Active interventions to stabilize mental health symptoms:  Patient reports taking medications as prescribed, patient rescheduled Addiction Medicine appointment.   Specific goals from treatment plan addressed this week:  Patient discussed that he would like to see a therapist, identified multiple areas that he would like to focus on.     Effectiveness of strategies:  Strategies appear effective.   Dimension #4 - Readiness  for Change - Risk 1. Patient reports being motivated for change, but has previously been unable to maintain abstinence when facing probation violations and skilled nursing time.   Specific goals from treatment plan addressed this week:  Patient attended 4/4 groups, but arrived late/left early three days.   Effectiveness of strategies:  Strategies appear effective, counselor to review attendance policy with patient.   Dimension #5 - Relapse Potential - Risk 3. Patient reports periods of abstinence and multiple treatments in the past. Patient identifies difficulty coping with negative emotions and impulsivity.   Specific goals from treatment plan addressed this week:  Patient discussed his use in group after discussing with counselor. Patient reported that he often falls into the pattern of short periods of abstinence with returning to use which lead to incarceration. Patient was given SLIP assignment to work on over the the weekend.   Effectiveness of strategies:  Strategies appear effective.   Dimension #6 - Recovery Environment - Risk 3. Patient reports he is unemployed and living with his mother helping take care of her. Patient reports current recovery group attendance. Patient reports significant criminal justice history.   Specific goals from treatment plan addressed this week:  Patient discussed going to meetings this week, meeting with sponsor.   Effectiveness of strategies:  Strategies appear effective.   T) Treatment plan updated: no  Patient notified and in agreement: Yes  Patient educated on Thinking. Patient has completed 29 of 90 program hours at this time. Projected discharge date is 5/15/18. Current discharge plan is Recovery Maintenance.     TATIANNA Valera, [facility]  3/17/2018, 1:56 PM       Weekly Educational Topics Date   1. Dual Diagnoses, week 1 2/13, 2/14   2. Dual Diagnoses, week 2 2/19, 2/21   3. Stress Management 2/26, 2/27, 2/28   4. Feelings/Emotions    5. Thinking 3/12, 3/13, 3/14, 3/16   6.  Change    7. Recovery Support    8. Relationships/Communication    9. Addiction 101    10. Relapse Prevention    11. Grief and Loss    12. Strengths    13. Wellness    Seeking Safety     Mindfulness  3/16

## 2021-06-16 NOTE — PROGRESS NOTES
Weekly Progress Note  Gustavo Saleh  1970  [id number]      D) Pt attended 2/4 groups this week, with two unexcused absences. No individual sessions were scheduled this week.   A) Staff facilitated groups and reviewed tx progress. Assessed for [facility]. R) No VAP needed at this time.     Any significant events, defines as events that impact patients relationship with others inside and outside of treatment: No  Indicate any changes or monitoring of physical or mental health problems: No  Indicate involvement by any outside supports: Yes: patient reports recovery group attendance  IAPP reviewed and modified as needed: NA    Pt working on the following dimensions:  Dimension #1 - Withdrawal Potential - Risk 0. 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   Specific goals from treatment plan addressed this week:  Patient reported abstinence this week Patient was not requested to complete a UA.   Effectiveness of strategies:  Strategies appear effective.   Dimension #2 - Biomedical - Risk 0. Patient reports no current health concerns and has access to medical care.   Specific goals from treatment plan addressed this week:  Patient reported that he is continuing to recover from a skin rash.    Effectiveness of strategies:  Strategies appear effective.   Dimension #3 - Emotional/Behavioral/Cognitive - Risk 2. Patient's medical record indicates diagnoses of adjustment disorder and ADHD (by history).  Active interventions to stabilize mental health symptoms:  Patient reports taking medications as prescribed.  Specific goals from treatment plan addressed this week:  Patient reported high level of stress, was encouraged by counselor to expand his awareness beyond the stressors to consider what else is going well.   Effectiveness of strategies:  Strategies appear effective.   Dimension #4 - Readiness for Change - Risk 1. Patient reports being  motivated for change, but has previously been unable to maintain abstinence when facing probation violations and care home time.   Specific goals from treatment plan addressed this week:  Patient attended 2/4 groups, and did not communicate about absences.   Effectiveness of strategies:  Strategies appear effective, counselor to complete a treatment contract with patient.   Dimension #5 - Relapse Potential - Risk 3. Patient reports periods of abstinence and multiple treatments in the past. Patient identifies difficulty coping with negative emotions and impulsivity.   Specific goals from treatment plan addressed this week:  Patient reported some urges this week, but no use.   Effectiveness of strategies:  Strategies appear effective.   Dimension #6 - Recovery Environment - Risk 3. Patient reports he is unemployed and living with his mother helping take care of her. Patient reports current recovery group attendance. Patient reports significant criminal justice history.   Specific goals from treatment plan addressed this week:  Patient discussed going to meetings this week, meeting with sponsor.   Effectiveness of strategies:  Strategies appear effective.   T) Treatment plan updated: no  Patient notified and in agreement: Yes  Patient educated on Change. Patient has completed 34 of 90 program hours at this time. Projected discharge date is 5/15/18. Current discharge plan is Recovery Maintenance.     Stephy Sanz, TATIANNA, [facility]  3/23/2018, 2:55 PM       Weekly Educational Topics Date   1. Dual Diagnoses, week 1 2/13, 2/14   2. Dual Diagnoses, week 2 2/19, 2/21   3. Stress Management 2/26, 2/27, 2/28   4. Feelings/Emotions    5. Thinking 3/12, 3/13, 3/14, 3/16   6. Change 3/20, 3/21   7. Recovery Support    8. Relationships/Communication    9. Addiction 101    10. Relapse Prevention    11. Grief and Loss    12. Strengths    13. Wellness    Seeking Safety     Mindfulness  3/16

## 2021-06-16 NOTE — PROGRESS NOTES
Weekly Progress Note  Gustavo Saleh  1970  [id number]      D) Pt attended 2/4 groups this week with 1 excused absence and 1 unexcused absence. No individual sessions were scheduled this week.   A) Staff facilitated groups and reviewed tx progress. Assessed for [facility]. R) No VAP needed at this time.     Any significant events, defines as events that impact patients relationship with others inside and outside of treatment: No  Indicate any changes or monitoring of physical or mental health problems: No  Indicate involvement by any outside supports: Yes: patient reports recovery group attendance  IAPP reviewed and modified as needed: NA    Pt working on the following dimensions:  Dimension #1 - Withdrawal Potential - Risk 0. Patient reports at intake date of last use on 1/10/18.  Specific goals from treatment plan addressed this week:  Patient did not report any use this past week. Patient was not requested to complete a UA this week.   Effectiveness of strategies:  Strategies appear effective.   Dimension #2 - Biomedical - Risk 0. Patient reports no current health concerns and has access to medical care.   Specific goals from treatment plan addressed this week:  Patient reported no changes to physical health.   Effectiveness of strategies:  Strategies appear effective.   Dimension #3 - Emotional/Behavioral/Cognitive - Risk 2. Patient's medical record indicates diagnoses of adjustment disorder and ADHD (by history).  Active interventions to stabilize mental health symptoms:  Patient reports taking medications as prescribed.   Specific goals from treatment plan addressed this week:  Patient discussed stress related to his legal concerns and taking care of his mother.    Effectiveness of strategies:  Strategies appear effective.   Dimension #4 - Readiness for Change - Risk 1. Patient reports being motivated for change, but has previously been unable to maintain abstinence when facing probation violations and prison  time.   Specific goals from treatment plan addressed this week:  Patient attended 2/4 groups and communicated about 1 absence but not the other. Patient also attend only 2 hours on one day.   Effectiveness of strategies:  Strategies appear effective.   Dimension #5 - Relapse Potential - Risk 3. Patient reports periods of abstinence and multiple treatments in the past. Patient identifies difficulty coping with negative emotions and impulsivity.   Specific goals from treatment plan addressed this week:  Patient reported some urges but no use this past week.   Effectiveness of strategies:  Strategies appear effective.   Dimension #6 - Recovery Environment - Risk 3. Patient reports he is unemployed and living with his mother helping take care of her. Patient reports current recovery group attendance. Patient reports significant criminal justice history.   Specific goals from treatment plan addressed this week:  Patient discussed going to meetings this week.   Effectiveness of strategies:  Strategies appear effective.   T) Treatment plan updated: yes.  Patient notified and in agreement: Yes.  Patient educated on Dual Diagnosis II. Patient has completed 11 of 90 program hours at this time. Projected discharge date is 5/15/18. Current discharge plan is Recovery Maintenance.     TATIANNA Valera, [facility]  2/23/2018, 8:12 PM       Weekly Educational Topics Date   1. Dual Diagnoses, week 1 2/13, 2/14   2. Dual Diagnoses, week 2 2/19, 2/21   3. Stress Management    4. Feelings/Emotions    5. Thinking    6. Change    7. Recovery Support    8. Relationships/Communication    9. Addiction 101    10. Relapse Prevention    11. Grief and Loss    12. Strengths    13. Wellness    Seeking Safety     Mindfulness

## 2021-06-16 NOTE — PROGRESS NOTES
Patient completed Diagnostic Assessment for Mental Health on 1/20/18 with  Dr. Sofía Soni PsyD, [facility].   Diagnoses at that time were:   Adjustment Disorder with mixed anxiety and depressed mood  ADHD by history  Methamphetamine Use Disorder, Severe.     Assessment was reviewed by [facility] staff.     TATIANNA Valera, [facility]  2/15/2018, 9:32 AM

## 2021-06-16 NOTE — PROGRESS NOTES
Gustavo Saleh Jr. attended 3 hours of group therapy today. Today was patient's first day of group. Patient introduced himself and was welcomed by group.     2/13/2018 12:09 PM Stephy aSnz   I have personally taken the history and examined this patient. I agree with the resident's note as stated above.  Pt has + provacative exam for CTS and ulnar nerve compression, pt has pain  - EMG ordered  - Dispensed bilateral wrist braces to wear at night  - F/u for EMG results

## 2021-06-16 NOTE — PROGRESS NOTES
[facility]  I, Gustavo HECTOR Saleh Jr., agree to following:    I will call by 1pm on the day of any absences. If I do not call by 1pm this absence is unexcused and may result in discharge from the program.     If I am out sick for 2+ days, I will provide a doctor s note to give to my counselor upon return to group.     I will arrive to group on time and inform counselor of any circumstances that may prevent me from getting to group on time.     I will schedule any and all appointments outside of group times. If there is an unavoidable conflict, I will discuss this with my counselor.     I will complete all homework on time and maintain engagement and participation in group.     I will not use my cell phone in group and will excuse myself if I need to take a call or reply back to a text message.     I will not leave group until break time, unless it is absolutely necessary.     I will maintain abstinence throughout my participation. I understand that in I engage it substance use I will be recommended to a higher level of care.      If I am unable to comply with these expectations, I understand that this behavior may result in a discharge from the [facility] OP program.      Patient Signature: _____________________________            Date: _____________          Staff Signature: TATIANNA Valera, [facility]             Date: 3/28/2018

## 2021-06-16 NOTE — PROGRESS NOTES
Gustavo Saleh Jr. attended 2 hours of group therapy today. Patient left early due to a meeting with his .     2/19/2018 12:25 PM Doc Coley

## 2021-06-16 NOTE — PROGRESS NOTES
Addiction Services - Initial Services Plan/Treatment Plan     Patient  Name: Gustavo Saleh Jr.  MRN: [id number]   : 1970  Admit Date: 2018         Patient describes their immediate need: To learn recovery skills to prevent relapse.     Are there any immediate Safety Needs such as (physical, stability, mobility): none noted - Pt is able to get medical care as needed. Pt denies immediate concerns.     Immediate Health Needs and Plan:   Remain abstinent from substance use and attend first group therapy session on 18    Vulnerable Adult: No     Issues to be addressed in the first sessions:   Treatment planning, orientation to group norms and rules, and welcomed by peers.     Patient strengths and needs:   Strengths identified as I don't give up  Needs identified as I need to learn to slow down    Plan for patient for time between intake and completion of the treatment plan:   Attend all group therapy sessions as directed, complete all written and oral assignments as directed, and remain clean and sober. A relapse, if any, must be reported to staff immediately in order to ensure you are receiving the proper level of care. If you cannot attend a group therapy session you must call contact information provided in intake folder and leave a message before or during group hours.     Dimension 1: Acute Intoxication/Withdrawal Potential, Risk level: 0    Problem: None noted  Ct reports OLYA of any substance as 18  Goal: Patient to maintain abstinence throughout outpatient treatment.   Must be reached to complete treatment? Yes  Methods/Strategies (must include amount and frequency):   1. Patient to report any substance/alcohol use to counselor to determine if any changes need to be made to address withdrawal symptoms.   2. Patient to complete any requested UAs.   Target Date: 18  Completion Date:     Dimension 2: Biomedical Conditions/Complications, Risk level: 0    Problem: Ct reports being in good  health, no medical conditions noted at the time of Intake.  Ct reports need to secure PCP.  Goal: Patient to maintain stable health throughout outpatient treatment.   Must be reached to complete treatment? No  Methods/Strategies (must include amount and frequency):   1. Patient to report any changes to physical health to counselor.   2. Patient willscheduled doctor s appointment and establish PCP.   3. Patient to take medications as prescribed.   Target Date: 5/13/18  Completion Date:       Dimension 3: Emotional/Behavioral/Cognitive, Risk level: 2    Problem: Ct reports ADHD and Adjustment Disorder.  Ct states that he struggles with focus and slowing down his thoughts.  Goal: Patient to build coping skills to address mental health symptoms  Must be reached to complete treatment? No  Methods/Strategies (must include amount and frequency):   1. Patient to begin using coping skills learned in therapeutic group (such as grounding, breathing, thought challenging, mindfulness, etc), and share in daily check-in any benefits or challenges that you experience using these skills.  Target Date: 5/13/18  Completion Date:       Dimension 4: Readiness to Change, Risk level 1    Problem: Ct admits significant criminal justice involvement overlying his continued drug use.  Goal: Patient to increase motivation towards recovery by participating in outpatient programming.   Must be reached to complete treatment? Yes  Methods/Strategies (must include amount and frequency):   1. Patient to attend 4, 3-hour groups per week and all scheduled 1:1s.  2. Patient will contact staff if unable to attend  - Vianca [phone], Stephy [phone]  Target Date: 5/13/18  Completion Date:     Dimension 5: Relapse/Continued Use/Continued Problem Potential, Risk level: 2    Problem: Ct states that he has maintained sobriety for 19 years at one point, but relapsed when faced with his sister's and father's death.  Goal: Develop coping skills and  relapse prevention strategies to utilize when experiencing triggers, cravings and/or urges to use illegal drugs or drink.  Must be reached to complete treatment? Yes  Methods/Strategies (must include amount and frequency):   Patient to share in daily check-in any urges and addictive thinking to better understand his pattern of use and to prevent relapse in the future.   Target Date: 5/13/18  Completion Date:     Dimension 6: Recovery Environment, Risk level: 2    Problem: Ct has 12 felonies, lives with his mother who he reports as terminally ill, and is unemployed  Goal: Comply with probation case plan and remain law-abiding.  Must be reached to complete treatment? Yes  Methods/Strategies (must include amount and frequency):   1.  Make a list of current behaviors and relationships that either support or harm your recovery.  2.  Attend weekly scheduled meetings with PO.   Target Date: 5/13/18  Completion Date:     Resources  Resources to which the patient is being referred for problems when problems are to be addressed concurrently by another provider: Mental Health Providers      Vulnerable Adult Review   [X] Review of the facility Abuse Prevention plan was reviewed with the patient   [X] No individual abuse plan is necessary   [ ] In addition to the facility Abuse Prevention plan, an Individual Abuse Plan will be put in place       Staff Name/Title: ORESTES Warren Date: 2/12/2018  Time: 1:37 PM    By signing this document, I am acknowledging that I was actively and directly involved in the development of my treatment plan.       Patient  Signature_________________________________________             Date__________________

## 2021-06-16 NOTE — PROGRESS NOTES
Gustavo Saleh Jr. attended 2 hours of group therapy today. Patient reviewed and signed a copy of this treatment plan.     3/12/2018 1:49 PM Doc Coley
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
## 2021-06-16 NOTE — PROGRESS NOTES
"LATE ENTRY FOR 2/28/18    D. Counselor met with patient for 35 minutes to discuss treatment progress. A. Counselor inquired about how the patient is feeling about his future and committing to recovery. Counselor discussed patient's treatment plan and what needs to change since the last time the patient attempted abstinence. Counselor encouraged patient to be more aware of his impulsive thinking. Counselor provided patient with copy of treatment verification previous faxed to patient's . R. Patient reported that things are looking more positive for his legal concerns, but reports that doing well and then \"screwing things up\" has been the pattern for a long time. Patient reported that he has a strong desire to live a recovery lifestyle but also doubts his abilities. Patient reports he is attending meetings and has a sponsor, also spends his time taking care of his mother. T. Counselor to update treatment plan based on 1:1.     Patient treatment was reviewed. Changes were made. Patient was actively and directly involved in the treatment plan review and updates.     TATIANNA Valera, [facility]  3/1/2018, 8:50 AM        "

## 2021-06-16 NOTE — PROGRESS NOTES
Weekly Progress Note  Gustavo Saleh  1970  [id number]      D) Pt attended 3/4 groups this week with 1 excused absence. 1 individual sessions was scheduled this week.   A) Staff facilitated groups and reviewed tx progress. Assessed for [facility]. R) No VAP needed at this time.     Any significant events, defines as events that impact patients relationship with others inside and outside of treatment: No  Indicate any changes or monitoring of physical or mental health problems: No  Indicate involvement by any outside supports: Yes: patient reports recovery group attendance  IAPP reviewed and modified as needed: NA    Pt working on the following dimensions:  Dimension #1 - Withdrawal Potential - Risk 0. Patient reports at intake date of last use on 1/10/18.  Specific goals from treatment plan addressed this week:  Patient did not report any use this past week. Patient was not requested to complete a UA this week.   Effectiveness of strategies:  Strategies appear effective.   Dimension #2 - Biomedical - Risk 0. Patient reports no current health concerns and has access to medical care.   Specific goals from treatment plan addressed this week:  Patient dentist appointment and MRI scan this week.   Effectiveness of strategies:  Strategies appear effective.   Dimension #3 - Emotional/Behavioral/Cognitive - Risk 2. Patient's medical record indicates diagnoses of adjustment disorder and ADHD (by history).  Active interventions to stabilize mental health symptoms:  Patient reports taking medications as prescribed.   Specific goals from treatment plan addressed this week:  Patient reported feeling more positive this week due to knowing more about his legal situation. Counselor used this as an example of the patient being aware of when he is getting ahead of himself and fortune telling the future.     Effectiveness of strategies:  Strategies appear effective.   Dimension #4 - Readiness for Change - Risk 1. Patient reports being  motivated for change, but has previously been unable to maintain abstinence when facing probation violations and alf time.   Specific goals from treatment plan addressed this week:  Patient attended 3/4 groups and communicated about absence.   Effectiveness of strategies:  Strategies appear effective.   Dimension #5 - Relapse Potential - Risk 3. Patient reports periods of abstinence and multiple treatments in the past. Patient identifies difficulty coping with negative emotions and impulsivity.   Specific goals from treatment plan addressed this week:  Patient reported some urges but no use this past week.   Effectiveness of strategies:  Strategies appear effective.   Dimension #6 - Recovery Environment - Risk 3. Patient reports he is unemployed and living with his mother helping take care of her. Patient reports current recovery group attendance. Patient reports significant criminal justice history.   Specific goals from treatment plan addressed this week:  Patient discussed going to meetings this week, meeting with sponsor.   Effectiveness of strategies:  Strategies appear effective.   T) Treatment plan updated: yes.  Patient notified and in agreement: No, patient did not attend group to sign treatment plan but is aware of changes.  Patient educated on Stress Management. Patient has completed 20 of 90 program hours at this time. Projected discharge date is 5/15/18. Current discharge plan is Recovery Maintenance.     TATIANNA Valera, [facility]  3/2/2018, 3:37 PM       Weekly Educational Topics Date   1. Dual Diagnoses, week 1 2/13, 2/14   2. Dual Diagnoses, week 2 2/19, 2/21   3. Stress Management 2/26, 2/27, 2/28   4. Feelings/Emotions    5. Thinking    6. Change    7. Recovery Support    8. Relationships/Communication    9. Addiction 101    10. Relapse Prevention    11. Grief and Loss    12. Strengths    13. Wellness    Seeking Safety     Mindfulness

## 2021-06-16 NOTE — PROGRESS NOTES
Weekly Progress Note  Gustavo Saleh  1970  [id number]      D) Pt attended 2/3 groups this week with 1 excused absences. No individual sessions were scheduled this week.   A) Staff facilitated groups and reviewed tx progress. Assessed for [facility]. R) No VAP needed at this time.     Any significant events, defines as events that impact patients relationship with others inside and outside of treatment: No  Indicate any changes or monitoring of physical or mental health problems: No  Indicate involvement by any outside supports: Yes: patient reports recovery group attendance  IAPP reviewed and modified as needed: NA    Pt working on the following dimensions:  Dimension #1 - Withdrawal Potential - Risk 0. Patient reports at intake date of last use on 1/10/18.  Specific goals from treatment plan addressed this week:  Patient did not report any use this past week. Patient was not requested to complete a UA this week.   Effectiveness of strategies:  Strategies appear effective.   Dimension #2 - Biomedical - Risk 0. Patient reports no current health concerns and has access to medical care.   Specific goals from treatment plan addressed this week:  Patient reported no changes to physical health.   Effectiveness of strategies:  Strategies appear effective.   Dimension #3 - Emotional/Behavioral/Cognitive - Risk 2. Patient's medical record indicates diagnoses of adjustment disorder and ADHD (by history).  Active interventions to stabilize mental health symptoms:  Patient reports taking medications as prescribed.   Specific goals from treatment plan addressed this week:  Patient discussed that he is trying to stay present but has a tendency to focus on events in the future.   Effectiveness of strategies:  Strategies appear effective.   Dimension #4 - Readiness for Change - Risk 1. Patient reports being motivated for change, but has previously been unable to maintain abstinence when facing probation violations and prison time.    Specific goals from treatment plan addressed this week:  Patient attended 2/3 groups and communicated about absences.   Effectiveness of strategies:  Strategies appear effective.   Dimension #5 - Relapse Potential - Risk 3. Patient reports periods of abstinence and multiple treatments in the past. Patient identifies difficulty coping with negative emotions and impulsivity.   Specific goals from treatment plan addressed this week:  Patient reported some urges but no use this past week.   Effectiveness of strategies:  Strategies appear effective.   Dimension #6 - Recovery Environment - Risk 3. Patient reports he is unemployed and living with his mother helping take care of her. Patient reports current recovery group attendance. Patient reports significant criminal justice history.   Specific goals from treatment plan addressed this week:  Patient discussed going to meetings this week.   Effectiveness of strategies:  Strategies appear effective.   T) Treatment plan updated: yes.  Patient notified and in agreement: Yes.  Patient educated on Dual Diagnosis I. Patient has completed 6 of 90 program hours at this time. Projected discharge date is 5/15/18. Current discharge plan is Recovery Maintenance.     TATIANNA Valera, [facility]  2/16/2018, 3:54 PM       Weekly Educational Topics Date   1. Dual Diagnoses, week 1 2/13, 2/14   2. Dual Diagnoses, week 2    3. Stress Management    4. Feelings/Emotions    5. Thinking    6. Change    7. Recovery Support    8. Relationships/Communication    9. Addiction 101    10. Relapse Prevention    11. Grief and Loss    12. Strengths    13. Wellness    Seeking Safety     Mindfulness

## 2021-06-16 NOTE — PROGRESS NOTES
Weekly Progress Note  Gustavo Saleh  1970  [id number]      D) Pt attended ZERO groups  this week with 4 excused absences. A) Staff facilitated groups and reviewed tx progress. Assessed for [facility]. R) Unable to assess along six dimensions or for [facility] due to lack of attendance.   T) Patient has completed 20 of 90 program hours at this time. Projected discharge date is 5/15/18. Current discharge plan is Recovery Maintenance.     TATIANNA Valera, [facility][note 2]  3/9/2018, 3:15 PM       Weekly Educational Topics Date   1. Dual Diagnoses, week 1 2/13, 2/14   2. Dual Diagnoses, week 2 2/19, 2/21   3. Stress Management 2/26, 2/27, 2/28   4. Feelings/Emotions    5. Thinking    6. Change    7. Recovery Support    8. Relationships/Communication    9. Addiction 101    10. Relapse Prevention    11. Grief and Loss    12. Strengths    13. Wellness    Seeking Safety     Mindfulness

## 2021-06-17 NOTE — PROGRESS NOTES
STEVEN. Counselor met with patient for 45 minutes to discuss treatment progress. A. Counselor inquired about what is currently going positively in the patient's life, and encouraged patient to expand his awareness. Counselor discussed patient's tendency to focus on what is difficult or getting ahead in his thinking. Counselor also encouraged patient to think about what he wants his relationship with his mother to look like and what is realistic at this point. Counselor discussed referrals for mental health. R. Patient reported that he was frustrated with his current work arrangement, but that he was able to challenge this and consider how it can be beneficial to him. Patient reported wanting his relationship with his mother to be like it was in the past, and also trying to make the most of the time he has left with her. Patient discussed that living in recovery is difficulty and it is uncomfortable to be open and vulnerable with his feelings now. Patient reported wanting to start therapy. T. Counselor to look into referrals for mental health.     Patient treatment was not reviewed.     TATIANNA Valera, [facility]  4/4/2018, 2:27 PM

## 2021-06-17 NOTE — PROGRESS NOTES
STEVEN. Counselor met with patient for 20 minutes to discuss treatment progress. A. Counselor inquired about additional ROIs for treatment verification letters, and made note that patient will need a letter for court on 5/14/18. Counselor inquired about patient's relationship with his mother, and acceptance that she may not be ready to be supportive of his recovery. Counselor encouraged patient to discuss grief and identity issues with therapist. R. Patient reported that he has court coming up on 5/14/18 and that he needs a letter indicating progress in treatment. Patient reported experiencing difficulty with his mother not being supportive, but that he is trying to focus on what he is doing for his recovery. Patient reported plan to discuss grief and identity concerns with therapist. T. Counselor to follow up with patient regarding treatment assignments.     TATIANNA Valera, [facility]  4/25/2018, 3:34 PM

## 2021-06-17 NOTE — PROGRESS NOTES
Weekly Progress Note  Gustavo Saleh  1970  [id number]      D) Pt attended 1/3 groups this week, with 2 excused absence and leaving group early 1 day. Counselor met with client for a brief 1:1 4/25, prior to his therapy appointment.  A) Staff facilitated groups and reviewed tx progress. Assessed for [facility]. R) No VAP needed at this time.     Any significant events, defines as events that impact patients relationship with others inside and outside of treatment: No  Indicate any changes or monitoring of physical or mental health problems: No  Indicate involvement by any outside supports: Yes: patient reports recovery group attendance.   IAPP reviewed and modified as needed: NA    Pt working on the following dimensions:  Dimension #1 - Withdrawal Potential - Risk 0. 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   Specific goals from treatment plan addressed this week:  Patient reported abstinence this week. Patient was not requested to completed a UA this week.   Effectiveness of strategies:  Strategies appear effective.   Dimension #2 - Biomedical - Risk 0. Patient reports no current health concerns and has access to medical care.   Specific goals from treatment plan addressed this week:  Patient reported no health concerns this week.   Effectiveness of strategies:  Strategies appear effective.   Dimension #3 - Emotional/Behavioral/Cognitive - Risk 2. Patient's medical record indicates diagnoses of adjustment disorder and ADHD (by history).  Active interventions to stabilize mental health symptoms:  Patient reports taking medications as prescribed.  Specific goals from treatment plan addressed this week:  Patient discussed feeling upset about his cousin getting a DWI and someone being hurt due to that incident. Ct was able to use good coping skills while discussing this in group and appeared to employ empathy along with healthy  "boundaries, not taking on someone else's crisis.  Effectiveness of strategies:  Strategies appear effective.   Dimension #4 - Readiness for Change - Risk 1. Patient reports being motivated for change, but has previously been unable to maintain abstinence when facing probation violations and alf time.   Specific goals from treatment plan addressed this week:  Patient attended 2/3 groups, and communicated with counselor about leaving early and missing group.    Effectiveness of strategies:  Strategies appear effective.    Dimension #5 - Relapse Potential - Risk 3. Patient reports periods of abstinence and multiple treatments in the past. Patient identifies difficulty coping with negative emotions and impulsivity.   Specific goals from treatment plan addressed this week:  Patient reported some urges this week, but no use. Patient discussed that he is more aware of his triggers this week related to a negative event and emotions from family members  Effectiveness of strategies:  Strategies appear effective.   Dimension #6 - Recovery Environment - Risk 3. Patient reports he is unemployed and living with his mother helping take care of her. Patient reports current recovery group attendance. Patient reports significant criminal justice history.   Specific goals from treatment plan addressed this week:  Patient discussed going to meetings this week, meeting with sponsor. Patient reported working most days, and appreciated making money from \"honest work.\"  Effectiveness of strategies:  Strategies appear effective.   T) Treatment plan updated: no  Patient notified and in agreement: N/A   Patient educated on Grief and Loss Patient has completed 61 of 90 program hours at this time. Projected discharge date is 5/30/18. Current discharge plan is Recovery Maintenance.     TATIANNA Valera, [facility]  4/29/2018, 2:20 PM       Weekly Educational Topics Date   1. Dual Diagnoses, week 1 2/13, 2/14   2. Dual Diagnoses, week 2 2/19, " 2/21   3. Stress Management 2/26, 2/27, 2/28   4. Feelings/Emotions    5. Thinking 3/12, 3/13, 3/14, 3/16   6. Change 3/20, 3/21   7. Recovery Support 3/28, 3/30   8. Relationships/Communication 4/3, 4/4, 4/6   9. Addiction 101 4/9, 4/11, 4/13   10. Relapse Prevention 4/17, 4/18   11. Grief and Loss 4/23   12. Strengths    13. Wellness    Seeking Safety     Mindfulness  3/16, 3/30, 4/6, 4/13

## 2021-06-17 NOTE — PROGRESS NOTES
Weekly Progress Note  Gustavo Saleh  1970  [id number]      D) Pt attended 1/3 groups this week, with 2 excused absence. No individual sessions were scheduled this week.   A) Staff facilitated groups and reviewed tx progress. Assessed for [facility]. R) No VAP needed at this time.     Any significant events, defines as events that impact patients relationship with others inside and outside of treatment: No  Indicate any changes or monitoring of physical or mental health problems: No  Indicate involvement by any outside supports: Yes: patient reports recovery group attendance.   IAPP reviewed and modified as needed: NA    Pt working on the following dimensions:  Dimension #1 - Withdrawal Potential - Risk 0. 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   Specific goals from treatment plan addressed this week:  Patient reported abstinence this week. Patient was not requested to completed a UA this week.   Effectiveness of strategies:  Strategies appear effective.   Dimension #2 - Biomedical - Risk 0. Patient reports no current health concerns and has access to medical care.   Specific goals from treatment plan addressed this week:  Patient reported no health concerns this week.   Effectiveness of strategies:  Strategies appear effective.   Dimension #3 - Emotional/Behavioral/Cognitive - Risk 2. Patient's medical record indicates diagnoses of adjustment disorder and ADHD (by history).  Active interventions to stabilize mental health symptoms:  Patient reports taking medications as prescribed. Patient was scheduled for individual therapy this week, but did not attend.   Specific goals from treatment plan addressed this week:  Patient discussed stress related to his mother's health and court next week.   Effectiveness of strategies:  Strategies appear effective.   Dimension #4 - Readiness for Change - Risk 1. Patient reports being motivated  for change, but has previously been unable to maintain abstinence when facing probation violations and senior living time.   Specific goals from treatment plan addressed this week:  Patient attended 1/3 groups, and communicated with counselor about absences.    Effectiveness of strategies:  Strategies appear effective.    Dimension #5 - Relapse Potential - Risk 3. Patient reports periods of abstinence and multiple treatments in the past. Patient identifies difficulty coping with negative emotions and impulsivity.   Specific goals from treatment plan addressed this week:  Patient reported some urges this week, but no use.   Effectiveness of strategies:  Strategies appear effective.   Dimension #6 - Recovery Environment - Risk 3. Patient reports he is unemployed and living with his mother helping take care of her. Patient reports current recovery group attendance. Patient reports significant criminal justice history.   Specific goals from treatment plan addressed this week:  Patient discussed going to meetings this week, meeting with sponsor. Patient reports continuing to work part-time. Patient reports court on 5/14/18.   Effectiveness of strategies:  Strategies appear effective.   T) Treatment plan updated: no  Patient notified and in agreement: N/A   Patient educated on Wellness.  Patient has completed 69 of 90 program hours at this time. Projected discharge date is 6/25/18. Current discharge plan is Recovery Maintenance.     TATIANNA Valera, [facility]  5/11/2018, 2:16 PM       Weekly Educational Topics Date   1. Dual Diagnoses, week 1 2/13, 2/14   2. Dual Diagnoses, week 2 2/19, 2/21   3. Stress Management 2/26, 2/27, 2/28   4. Feelings/Emotions    5. Thinking 3/12, 3/13, 3/14, 3/16   6. Change 3/20, 3/21   7. Recovery Support 3/28, 3/30   8. Relationships/Communication 4/3, 4/4, 4/6   9. Addiction 101 4/9, 4/11, 4/13   10. Relapse Prevention 4/17, 4/18   11. Grief and Loss 4/23   12. Strengths 4/30, 5/2   13. Wellness  5/7   Seeking Safety     Mindfulness  3/16, 3/30, 4/6, 4/13

## 2021-06-17 NOTE — PROGRESS NOTES
Gustavo Saleh Jr. attended 2 hours of group therapy today. Patient received updated treatment plan and signed with no additional changes.     4/11/2018 12:09 PM Stephy Sanz

## 2021-06-17 NOTE — PROGRESS NOTES
Gustavo Saleh Jr. attended 2 hours of group therapy today. Patient reported needing to leave group early due to a doctor's appointment.     5/2/2018 1:46 PM Stephy Sanz

## 2021-06-17 NOTE — PROGRESS NOTES
Gustavo Saleh Jr. attended 3 hours of group therapy today. Counselor assisted patient in scheduling therapy appointment in [facility].     4/9/2018 1:32 PM Stephy Sanz

## 2021-06-17 NOTE — PROGRESS NOTES
LATE ENTRY FOR 4/9/18--4/13/18    Weekly Progress Note  Gustavo Saleh  1970  [id number]      D) Pt attended 3/3 groups this week, counselor and patient agreed to patient stepping down to 3x per week. No individual sessions were scheduled this week.   A) Staff facilitated groups and reviewed tx progress. Assessed for [facility]. R) No VAP needed at this time.     Any significant events, defines as events that impact patients relationship with others inside and outside of treatment: No  Indicate any changes or monitoring of physical or mental health problems: No  Indicate involvement by any outside supports: Yes: patient reports recovery group attendance, scheduled appointments in [facility]. Patient also reported attending probation meeting.   IAPP reviewed and modified as needed: NA    Pt working on the following dimensions:  Dimension #1 - Withdrawal Potential - Risk 0. 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   Specific goals from treatment plan addressed this week:  Patient reported abstinence this week. Patient was not requested to completed a UA this week.   Effectiveness of strategies:  Strategies appear effective.   Dimension #2 - Biomedical - Risk 0. Patient reports no current health concerns and has access to medical care.   Specific goals from treatment plan addressed this week:  Patient reported no health concerns this week.   Effectiveness of strategies:  Strategies appear effective.   Dimension #3 - Emotional/Behavioral/Cognitive - Risk 2. Patient's medical record indicates diagnoses of adjustment disorder and ADHD (by history).  Active interventions to stabilize mental health symptoms:  Patient reports taking medications as prescribed.  Specific goals from treatment plan addressed this week:  Patient is scheduled to see LIAN Trimble for therapy on 4/25/18. Patient discussed that he is more aware of  his thinking now, particularly of his tendency to think of criminal behavior first, but that he is able to choose better alternatives.   Effectiveness of strategies:  Strategies appear effective.   Dimension #4 - Readiness for Change - Risk 1. Patient reports being motivated for change, but has previously been unable to maintain abstinence when facing probation violations and senior care time.   Specific goals from treatment plan addressed this week:  Patient attended 3/3 groups, and discuss with counselor of stepping down to 3 days per week. Patient informed counselor of needing to leave early for work 1 day.    Effectiveness of strategies:  Strategies appear effective.    Dimension #5 - Relapse Potential - Risk 3. Patient reports periods of abstinence and multiple treatments in the past. Patient identifies difficulty coping with negative emotions and impulsivity.   Specific goals from treatment plan addressed this week:  Patient reported some urges this week, but no use. Patient reported using mindfulness skills to address criminal thinking.   Effectiveness of strategies:  Strategies appear effective.   Dimension #6 - Recovery Environment - Risk 3. Patient reports he is unemployed and living with his mother helping take care of her. Patient reports current recovery group attendance. Patient reports significant criminal justice history.   Specific goals from treatment plan addressed this week:  Patient discussed going to meetings this week, meeting with sponsor. Patient reported having a positive meeting with his .   Effectiveness of strategies:  Strategies appear effective.   T) Treatment plan updated: yes  Patient notified and in agreement: Yes  Patient educated on Addiction 101. Patient has completed 56 of 90 program hours at this time. Projected discharge date is 5/15/18. Current discharge plan is Recovery Maintenance.     TATIANNA Valera, [facility]  4/16/2018, 4:00 PM       Weekly Educational  Topics Date   1. Dual Diagnoses, week 1 2/13, 2/14   2. Dual Diagnoses, week 2 2/19, 2/21   3. Stress Management 2/26, 2/27, 2/28   4. Feelings/Emotions    5. Thinking 3/12, 3/13, 3/14, 3/16   6. Change 3/20, 3/21   7. Recovery Support 3/28, 3/30   8. Relationships/Communication 4/3, 4/4, 4/6   9. Addiction 101 4/9, 4/11, 4/13   10. Relapse Prevention    11. Grief and Loss    12. Strengths    13. Wellness    Seeking Safety     Mindfulness  3/16, 3/30, 4/6, 4/13

## 2021-06-17 NOTE — PROGRESS NOTES
LATE ENTRY FOR 4/40/18--5/4/18    Weekly Progress Note  Gustavo Saleh  1970  [id number]      D) Pt attended 2/3 groups this week, with 1 excused absence. No individual sessions were scheduled this week.   A) Staff facilitated groups and reviewed tx progress. Assessed for [facility]. R) No VAP needed at this time.     Any significant events, defines as events that impact patients relationship with others inside and outside of treatment: No  Indicate any changes or monitoring of physical or mental health problems: No  Indicate involvement by any outside supports: Yes: patient reports recovery group attendance.   IAPP reviewed and modified as needed: NA    Pt working on the following dimensions:  Dimension #1 - Withdrawal Potential - Risk 0. 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   Specific goals from treatment plan addressed this week:  Patient reported abstinence this week. Patient was not requested to completed a UA this week.   Effectiveness of strategies:  Strategies appear effective.   Dimension #2 - Biomedical - Risk 0. Patient reports no current health concerns and has access to medical care.   Specific goals from treatment plan addressed this week:  Patient reported no health concerns this week.   Effectiveness of strategies:  Strategies appear effective.   Dimension #3 - Emotional/Behavioral/Cognitive - Risk 2. Patient's medical record indicates diagnoses of adjustment disorder and ADHD (by history).  Active interventions to stabilize mental health symptoms:  Patient reports taking medications as prescribed.  Specific goals from treatment plan addressed this week:  Patient discussed that he is uncertain how court will go in a few weeks, but is able to challenge his thinking by reminding himself that his work in recovery has been beneficial.   Effectiveness of strategies:  Strategies appear effective.   Dimension #4 -  Readiness for Change - Risk 1. Patient reports being motivated for change, but has previously been unable to maintain abstinence when facing probation violations and shelter time.   Specific goals from treatment plan addressed this week:  Patient attended 2/3 groups, and communicated with counselor about absence.    Effectiveness of strategies:  Strategies appear effective.    Dimension #5 - Relapse Potential - Risk 3. Patient reports periods of abstinence and multiple treatments in the past. Patient identifies difficulty coping with negative emotions and impulsivity.   Specific goals from treatment plan addressed this week:  Patient reported some urges this week, but no use.   Effectiveness of strategies:  Strategies appear effective.   Dimension #6 - Recovery Environment - Risk 3. Patient reports he is unemployed and living with his mother helping take care of her. Patient reports current recovery group attendance. Patient reports significant criminal justice history.   Specific goals from treatment plan addressed this week:  Patient discussed going to meetings this week, meeting with sponsor. Patient reported meeting with probation.   Effectiveness of strategies:  Strategies appear effective.   T) Treatment plan updated: no  Patient notified and in agreement: N/A   Patient educated on Grief and Loss Patient has completed 66 of 90 program hours at this time. Projected discharge date is 5/30/18. Current discharge plan is Recovery Maintenance.     TATIANNA Valera, [facility]  5/7/2018, 5:05 PM       Weekly Educational Topics Date   1. Dual Diagnoses, week 1 2/13, 2/14   2. Dual Diagnoses, week 2 2/19, 2/21   3. Stress Management 2/26, 2/27, 2/28   4. Feelings/Emotions    5. Thinking 3/12, 3/13, 3/14, 3/16   6. Change 3/20, 3/21   7. Recovery Support 3/28, 3/30   8. Relationships/Communication 4/3, 4/4, 4/6   9. Addiction 101 4/9, 4/11, 4/13   10. Relapse Prevention 4/17, 4/18   11. Grief and Loss 4/23   12. Strengths  4/30, 5/2 13. Wellness    Seeking Safety     Mindfulness  3/16, 3/30, 4/6, 4/13

## 2021-06-17 NOTE — PROGRESS NOTES
Weekly Progress Note  Gustavo Saleh  1970  [id number]      D) Pt attended 3/4 groups this week, with one excused absence. Patient attended 1:1.   A) Staff facilitated groups and reviewed tx progress. Assessed for [facility]. R) No VAP needed at this time.     Any significant events, defines as events that impact patients relationship with others inside and outside of treatment: No  Indicate any changes or monitoring of physical or mental health problems: No  Indicate involvement by any outside supports: Yes: patient reports recovery group attendance  IAPP reviewed and modified as needed: NA    Pt working on the following dimensions:  Dimension #1 - Withdrawal Potential - Risk 0. 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   Specific goals from treatment plan addressed this week:  Patient reported abstinence this week Patient was requested to complete a UA on 4/6/18 (and the patient had also requested to do a UA for accountability), but patient's medical record does not indicate that the UA was completed.   Effectiveness of strategies:  Strategies appear effective, counselor to review missed UA with patient.   Dimension #2 - Biomedical - Risk 0. Patient reports no current health concerns and has access to medical care.   Specific goals from treatment plan addressed this week:  Patient reported some improvement with skin problems, is still taking medications.   Effectiveness of strategies:  Strategies appear effective.   Dimension #3 - Emotional/Behavioral/Cognitive - Risk 2. Patient's medical record indicates diagnoses of adjustment disorder and ADHD (by history).  Active interventions to stabilize mental health symptoms:  Patient reports taking medications as prescribed.  Specific goals from treatment plan addressed this week:  Patient reported he is still interested in seeing a therapist, counselor reached out to a new provider for  availability, but had not heard back at the end of the week.   Effectiveness of strategies:  Strategies appear effective.   Dimension #4 - Readiness for Change - Risk 1. Patient reports being motivated for change, but has previously been unable to maintain abstinence when facing probation violations and [facility] time.   Specific goals from treatment plan addressed this week:  Patient attended 2/4 groups, and communicated about absences. Patient requested a treatment contract and reported willingness to follow plan. Patient left group early on 3/30/18 and did not inform staff.    Effectiveness of strategies:  Strategies appear effective.    Dimension #5 - Relapse Potential - Risk 3. Patient reports periods of abstinence and multiple treatments in the past. Patient identifies difficulty coping with negative emotions and impulsivity.   Specific goals from treatment plan addressed this week:  Patient reported some urges this week, but no use. Patient reported that he was aware that having money this week was a trigger and that he is was moving towards a relapse, but was able to take preventative measures.   Effectiveness of strategies:  Strategies appear effective.   Dimension #6 - Recovery Environment - Risk 3. Patient reports he is unemployed and living with his mother helping take care of her. Patient reports current recovery group attendance. Patient reports significant criminal justice history.   Specific goals from treatment plan addressed this week:  Patient discussed going to meetings this week, meeting with sponsor. Patient discussed starting a new job and that he is trying to have a positive outlook. Patient discussed challenges in his relationship with his mother and other family members, particularly related to their views of his recovery.   Effectiveness of strategies:  Strategies appear effective.   T) Treatment plan updated: no  Patient notified and in agreement: Yes  Patient educated on Relationships.  Patient has completed 48 of 90 program hours at this time. Projected discharge date is 5/15/18. Current discharge plan is Recovery Maintenance.     TATIANNA Valera, [facility]  4/6/2018, 2:52 PM       Weekly Educational Topics Date   1. Dual Diagnoses, week 1 2/13, 2/14   2. Dual Diagnoses, week 2 2/19, 2/21   3. Stress Management 2/26, 2/27, 2/28   4. Feelings/Emotions    5. Thinking 3/12, 3/13, 3/14, 3/16   6. Change 3/20, 3/21   7. Recovery Support 3/28, 3/30   8. Relationships/Communication 4/3, 4/4, 4/6   9. Addiction 101    10. Relapse Prevention    11. Grief and Loss    12. Strengths    13. Wellness    Seeking Safety     Mindfulness  3/16, 3/30, 4/6

## 2021-06-17 NOTE — PROGRESS NOTES
Weekly Progress Note  Gustavo Saleh  1970  [id number]      D) Pt attended 2/3 groups this week, with 1 excused absence and leaving group early two days. No individual sessions were scheduled this week.   A) Staff facilitated groups and reviewed tx progress. Assessed for [facility]. R) No VAP needed at this time.     Any significant events, defines as events that impact patients relationship with others inside and outside of treatment: No  Indicate any changes or monitoring of physical or mental health problems: No  Indicate involvement by any outside supports: Yes: patient reports recovery group attendance.   IAPP reviewed and modified as needed: NA    Pt working on the following dimensions:  Dimension #1 - Withdrawal Potential - Risk 0. 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   Specific goals from treatment plan addressed this week:  Patient reported abstinence this week. Patient was not requested to completed a UA this week.   Effectiveness of strategies:  Strategies appear effective.   Dimension #2 - Biomedical - Risk 0. Patient reports no current health concerns and has access to medical care.   Specific goals from treatment plan addressed this week:  Patient reported no health concerns this week.   Effectiveness of strategies:  Strategies appear effective.   Dimension #3 - Emotional/Behavioral/Cognitive - Risk 2. Patient's medical record indicates diagnoses of adjustment disorder and ADHD (by history).  Active interventions to stabilize mental health symptoms:  Patient reports taking medications as prescribed.  Specific goals from treatment plan addressed this week:  Patient discussed using mindfulness skills to be patient while interacting with customers. Patient displayed intense emotions when another group member discussed problem where she is living and patient's initial reaction was to go to negative behavior, but was  "able to regulate his emotions with encouragement from counselor.   Effectiveness of strategies:  Strategies appear effective.   Dimension #4 - Readiness for Change - Risk 1. Patient reports being motivated for change, but has previously been unable to maintain abstinence when facing probation violations and snf time.   Specific goals from treatment plan addressed this week:  Patient attended 2/3 groups, and communicated with counselor about leaving early and missing group.    Effectiveness of strategies:  Strategies appear effective.    Dimension #5 - Relapse Potential - Risk 3. Patient reports periods of abstinence and multiple treatments in the past. Patient identifies difficulty coping with negative emotions and impulsivity.   Specific goals from treatment plan addressed this week:  Patient reported some urges this week, but no use. Patient discussed that he is more aware of his pattern of use, particularly related to learning about neurochemistry from previousl week.   Effectiveness of strategies:  Strategies appear effective.   Dimension #6 - Recovery Environment - Risk 3. Patient reports he is unemployed and living with his mother helping take care of her. Patient reports current recovery group attendance. Patient reports significant criminal justice history.   Specific goals from treatment plan addressed this week:  Patient discussed going to meetings this week, meeting with sponsor. Patient reported working most days, and appreciated making money from \"honest work.\"  Effectiveness of strategies:  Strategies appear effective.   T) Treatment plan updated: no  Patient notified and in agreement: N/A   Patient educated on Relapse Prevention. Patient has completed 59 of 90 program hours at this time. Projected discharge date is 5/30/18. Current discharge plan is Recovery Maintenance.     TATIANNA Valera, [facility]  4/21/2018, 1:06 PM       Weekly Educational Topics Date   1. Dual Diagnoses, week 1 2/13, 2/14 "   2. Dual Diagnoses, week 2 2/19, 2/21   3. Stress Management 2/26, 2/27, 2/28   4. Feelings/Emotions    5. Thinking 3/12, 3/13, 3/14, 3/16   6. Change 3/20, 3/21   7. Recovery Support 3/28, 3/30   8. Relationships/Communication 4/3, 4/4, 4/6   9. Addiction 101 4/9, 4/11, 4/13   10. Relapse Prevention 4/17, 4/18   11. Grief and Loss    12. Strengths    13. Wellness    Seeking Safety     Mindfulness  3/16, 3/30, 4/6, 4/13

## 2021-06-17 NOTE — PROGRESS NOTES
Weekly Progress Note  Gustavo Saleh  1970  [id number]      D) Pt attended 2/4 groups this week, with two excused absences. Counselor met with patient briefly to complete treatment contract.   A) Staff facilitated groups and reviewed tx progress. Assessed for [facility]. R) No VAP needed at this time.     Any significant events, defines as events that impact patients relationship with others inside and outside of treatment: No  Indicate any changes or monitoring of physical or mental health problems: No  Indicate involvement by any outside supports: Yes: patient reports recovery group attendance  IAPP reviewed and modified as needed: NA    Pt working on the following dimensions:  Dimension #1 - Withdrawal Potential - Risk 0. 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   Specific goals from treatment plan addressed this week:  Patient reported abstinence this week Patient was requested to complete a UA on 3/30/18 which was negative for all tested substances.   Effectiveness of strategies:  Strategies appear effective.   Dimension #2 - Biomedical - Risk 0. Patient reports no current health concerns and has access to medical care.   Specific goals from treatment plan addressed this week:  Patient reported continued skin problems, but reported attending appointments and taking medications.    Effectiveness of strategies:  Strategies appear effective.   Dimension #3 - Emotional/Behavioral/Cognitive - Risk 2. Patient's medical record indicates diagnoses of adjustment disorder and ADHD (by history).  Active interventions to stabilize mental health symptoms:  Patient reports taking medications as prescribed.  Specific goals from treatment plan addressed this week:  Patient discussed plan to see a therapist.   Effectiveness of strategies:  Strategies appear effective.   Dimension #4 - Readiness for Change - Risk 1. Patient reports being  motivated for change, but has previously been unable to maintain abstinence when facing probation violations and halfway time.   Specific goals from treatment plan addressed this week:  Patient attended 2/4 groups, and communicated about absences. Patient requested a treatment contract and reported willingness to follow plan. Patient left group early on 3/30/18 and did not inform staff.    Effectiveness of strategies:  Strategies appear effective.    Dimension #5 - Relapse Potential - Risk 3. Patient reports periods of abstinence and multiple treatments in the past. Patient identifies difficulty coping with negative emotions and impulsivity.   Specific goals from treatment plan addressed this week:  Patient reported some urges this week, but no use.   Effectiveness of strategies:  Strategies appear effective.   Dimension #6 - Recovery Environment - Risk 3. Patient reports he is unemployed and living with his mother helping take care of her. Patient reports current recovery group attendance. Patient reports significant criminal justice history.   Specific goals from treatment plan addressed this week:  Patient discussed going to meetings this week, meeting with sponsor. Patient also reported that he is planning on starting a new job soon.  Effectiveness of strategies:  Strategies appear effective.   T) Treatment plan updated: no  Patient notified and in agreement: Yes  Patient educated on Recovery Support. Patient has completed 38 of 90 program hours at this time. Projected discharge date is 5/15/18. Current discharge plan is Recovery Maintenance.     TATIANNA Valera, [facility]  3/31/2018, 8:44 PM       Weekly Educational Topics Date   1. Dual Diagnoses, week 1 2/13, 2/14   2. Dual Diagnoses, week 2 2/19, 2/21   3. Stress Management 2/26, 2/27, 2/28   4. Feelings/Emotions    5. Thinking 3/12, 3/13, 3/14, 3/16   6. Change 3/20, 3/21   7. Recovery Support 3/28, 3/30   8. Relationships/Communication    9. Addiction 101     10. Relapse Prevention    11. Grief and Loss    12. Strengths    13. Wellness    Seeking Safety     Mindfulness  3/16, 3/30

## 2021-06-17 NOTE — PROGRESS NOTES
Individual Treatment Plan--Update    Patient  Name: Gustavo Saelh Jr.  MRN: [id number]   : 1970  Admit Date: 18  Date of Initial Service Plan: 18  Tentative Discharge Date: 18    Counselor: TATIANNA Valera LADC      Dimension 1: Acute Intoxication/Withdrawal Potential, Risk level: 0     Problem: None noted. Patient reported on 3/14/18 using on 3/8/18 and 3/9/18.   Goal: Patient to maintain abstinence throughout outpatient treatment.   Must be reached to complete treatment? Yes  Methods/Strategies (must include amount and frequency):   1. Patient to report any substance/alcohol use to counselor to determine if any changes need to be made to address withdrawal symptoms.   2. Patient to complete any requested UAs.   Target Date: 18  Completion Date:        Dimension 2: Biomedical Conditions/Complications, Risk level: 0     Problem: Ct reports being in good health, no medical conditions noted at the time of Intake.  Ct reports need to secure PCP.  Goal: Patient to maintain stable health throughout outpatient treatment.   Must be reached to complete treatment? No  Methods/Strategies (must include amount and frequency):   1. Patient to report any changes to physical health to counselor.   2. Patient to take medications as prescribed.   Target Date: 18  Completion Date:   1. Patient will scheduled doctor s appointment and establish PCP.   Target Date: 3/30/18  Completion Date:        Dimension 3: Emotional/Behavioral/Cognitive, Risk level: 2     Problem: Ct reports ADHD and Adjustment Disorder.  Ct states that he struggles with focus and slowing down his thoughts.  Goal: Patient to build coping skills to address mental health symptoms  Must be reached to complete treatment? No  Methods/Strategies (must include amount and frequency):   1. Patient to begin using coping skills learned in therapeutic group (such as grounding, breathing, thought challenging, mindfulness, etc), and  share in daily check-in any benefits or challenges that you experience using these skills.  Target Date: 5/30/18  Completion Date:   2. Patient to attempt mindfulness practice at least 1x per week. Share with counselor/group any benefits or challenges you experience.  Target Date: 5/30/18  Completion Date:     Problem: Patient reports wanting to address his mental health symptoms.   Goal: Patient to establish care with mental health services.   Must be reached to completed treatment? No  Methods/Strategies (must include amount and frequency):   1. Patient to attend psychotherapy appointment with LIAN Tribmle on 4/25/18. If you are unable to attend this appointment, you must contact the clinic at [phone] as soon as possible.  Target Date: 4/30/18  Completion Date:        Dimension 4: Readiness to Change, Risk level 1     Problem: Ct admits significant criminal justice involvement overlying his continued drug use.  Goal: Patient to increase motivation towards recovery by participating in outpatient programming.   Must be reached to complete treatment? Yes  Methods/Strategies (must include amount and frequency):   1. Patient to attend 4, 3-hour groups per week and all scheduled 1:1s.  2. Patient will contact staff if unable to attend  - Vianca [phone], Stephy [phone]  Target Date: 5/30/18  Completion Date: 4/9/18  1. Patient to attend 3, 3-hour groups per week and all scheduled 1:1s.   2. Patient will contact staff if unable to attend  - Vianca [phone], Stephy [phone]  Target Date: 4/30/18  Completion Date:   1. Patient to identify 3 goals for his life and identify how staying sober and not staying sober will impact reaching these goals. Share with counselor.   Target Date: 4/20/18  Completion Date:        Dimension 5: Relapse/Continued Use/Continued Problem Potential, Risk level: 2     Problem: Ct states that he has maintained sobriety for 19 years at one point, but relapsed when  faced with his sister's and father's death.  Goal: Develop coping skills and relapse prevention strategies to utilize when experiencing triggers, cravings and/or urges to use illegal drugs or drink.  Must be reached to complete treatment? Yes  Methods/Strategies (must include amount and frequency):   1. Patient to share in daily check-in any urges and addictive thinking to better understand his pattern of use and to prevent relapse in the future.   Target Date: 5/30/18  Completion Date:   1. Patient to identify 5 things that need to change since the last time recovery was attempted. Share with counselor.   Target Date: 4/16/18  Completion Date:        Dimension 6: Recovery Environment, Risk level: 2     Problem: Ct has 12 felonies, lives with his mother who he reports as terminally ill, and is unemployed  Goal: Comply with probation case plan and remain law-abiding.  Must be reached to complete treatment? Yes  Methods/Strategies (must include amount and frequency):   1.  Make a list of current behaviors and relationships that either support or harm your recovery.  Target Date: 4/16/18  Completion Date:   1.  Attend weekly scheduled meetings with PO.   Target Date: 5/30/18  Completion Date:   Problem: Patient reports he is building relationships in the recovery community.   Goal: Patient to continue to build support.   Must be reached to completed treatment? Yes  Methods/Strategies (must include amount and frequency):   1. Patient to attend at least 2 recovery meetings per week. Share with group/counselor any benefits or challenges you experience.   Target Date: 5/30/18  Completion Date:         Resources  Resources to which the patient is being referred for problems when problems are to be addressed concurrently by another provider: Patient reports current services through [facility] clinic.       By signing this document, I am acknowledging that I was actively and directly involved in the development of my  treatment plan.           Patient  Signature_________________________________________         Date__________________        Staff Signature  TATIANNA Valera LAD    Date: 4/9/2018, 2:57 PM

## 2021-06-17 NOTE — PROGRESS NOTES
Gustavo Saleh Jr. attended 1 hours of group therapy today. Patient reported needing to leave group early for work. Patient reported feeling upset about a situation with another patient, counselor encouraged patient to not move towards criminal thinking and behavior.     4/18/2018 2:11 PM Stephy Sanz
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
## 2021-06-18 NOTE — PROGRESS NOTES
LATE ENTRY FOR 6/13/18.    MICHELET Counselor met with patient for 35 minutes to discuss treatment progress. A. Counselor inquired about patient's decision to switch jobs, and making sure that he has appropriate accountability. Counselor encouraged patient to be aware of how comparisons are effecting how he experiences things. Counselor inquired about patient's progress in establishing mental health care. Counselor discussed [facility] with a referral to Recovery Maintenance. R. Patient reported that he has some concerns about handling money at his new job, but has discussed accountability with his supervisor. Patient discussed feeling bored frequently and that he notices how he compares experiences to others or past experiences. Patient reported needing to set up appointment for therapy and the dentist. T. Counselor to update treatment plan, patient to continue to work on treatment plan.     Patient treatment was reviewed. Changes were made. Patient was actively and directly involved in the treatment plan review and updates.     TATIANNA Valera, [facility]  6/14/2018, 4:34 PM
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
## 2021-06-18 NOTE — PROGRESS NOTES
No show for f/u psychotherapy. Patient has no-showed 3x and late cancelled 1x for past 4 scheduled sessions. Termination letter will be sent.

## 2021-06-18 NOTE — PROGRESS NOTES
Weekly Progress Note   Gustavo Saleh  1970  [id number]      D) Pt attended 2/2 groups this week, with no absences. No individual sessions were scheduled this week.   A) Staff facilitated groups and reviewed tx progress. [facility] for [facility]. R) No VAP needed at this time.     Any significant events, defines as events that impact patients relationship with others inside and outside of treatment: None reported this week.   Indicate any changes or monitoring of physical or mental health problems: No  Indicate involvement by any outside supports: Yes: patient reports recovery group attendance.    IAPP reviewed and modified as needed: NA    Pt working on the following dimensions:  Dimension #1 - Withdrawal Potential - Risk 0. 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 Patient completed a UA on 6/1/18 which was negative for all tested substances.  Specific goals from treatment plan addressed this week:  Patient reported abstinence this week.  Patient was not requested to complete any UAs this week.   Effectiveness of strategies:  Strategies appear effective.   Dimension #2 - Biomedical - Risk 0. Patient reports no current health concerns and has access to medical care.   Specific goals from treatment plan addressed this week:  Patient reported no health concerns this week.   Effectiveness of strategies:  Strategies appear effective.   Dimension #3 - Emotional/Behavioral/Cognitive - Risk 2. Patient's medical record indicates diagnoses of adjustment disorder and ADHD (by history). Patient indicates emotional stress related to his mother's cancer prognosis, and identifies that his addiction and criminal behavior has caused problems in relationships.   Active interventions to stabilize mental health symptoms:  Patient reports taking medications as prescribed.   Specific goals from treatment plan addressed this week:  Patient discussed the  difficulty of seeing his mother go through cancer treatment, and noticed that he is feeling more stressed. Patient was encouraged by group to make sure he is doing self-care.   Effectiveness of strategies:  Strategies appear effective.    Dimension #4 - Readiness for Change - Risk 1. Patient reports being motivated for change, but has previously been unable to maintain abstinence when facing probation violations and alf time.   Specific goals from treatment plan addressed this week:  Patient attended 2/2 groups this week.    Effectiveness of strategies:  Strategies appear effective.    Dimension #5 - Relapse Potential - Risk 3. Patient reports periods of abstinence and multiple treatments in the past. Patient identifies difficulty coping with negative emotions and impulsivity.   Specific goals from treatment plan addressed this week:  Patient reported no use this past week, but discussed feeling more urges, particularly related to feeling stressed about his mother's health. Patient was encouraged to make a plan for the day and stick to it.   Effectiveness of strategies:  Strategies appear effective.   Dimension #6 - Recovery Environment - Risk 3. Patient reports he is working part-time and living with his mother helping take care of her. Patient reports current recovery group attendance. Patient reports significant criminal justice history.   Specific goals from treatment plan addressed this week:  Patient reported starting a new job closer to his home. Patient reported completing a UA with probation on 6/15/18. Patient reported he is engaging in multiple sober recovery activities, including darts and bowling.   Effectiveness of strategies:  Strategies appear effective.   T) Treatment plan updated: no Patient notified and in agreement: yes, patient signed plan that was previously updated this week.   Patient educated on Change.  Patient has completed 90 of 90 program hours at this time. Projected discharge date  is 6/30/18. Current discharge plan is Recovery Maintenance.     Stephy Sanz MPS, [facility]  6/22/2018, 8:12 AM       Weekly Educational Topics Date   1. Dual Diagnoses, week 1 2/13, 2/14; 5/16   2. Dual Diagnoses, week 2 2/19, 2/21; 5/22   3. Stress Management 2/26, 2/27, 2/28; 6/1   4. Feelings/Emotions 6/5, 6/8   5. Thinking 3/12, 3/13, 3/14, 3/16; 6/13   6. Change 3/20, 3/21; 6/18, 6/19   7. Recovery Support 3/28, 3/30   8. Relationships/Communication 4/3, 4/4, 4/6   9. Addiction 101 4/9, 4/11, 4/13   10. Relapse Prevention 4/17, 4/18   11. Grief and Loss 4/23   12. Strengths 4/30, 5/2   13. Wellness 5/7   Seeking Safety     Mindfulness  3/16, 3/30, 4/6, 4/13, 6/1

## 2021-06-18 NOTE — PROGRESS NOTES
Weekly Progress Note   Gustavo Saleh  1970  [id number]      D) Pt attended 1/2 groups this week, with 1 excused absence. Patient attended 1 individual session this week.   A) Staff facilitated groups and reviewed tx progress. Assessed for [facility]. R) No VAP needed at this time.     Any significant events, defines as events that impact patients relationship with others inside and outside of treatment: None reported this week.   Indicate any changes or monitoring of physical or mental health problems: No  Indicate involvement by any outside supports: Yes: patient reports recovery group attendance. Counselor spoke with patient's .   IAPP reviewed and modified as needed: NA    Pt working on the following dimensions:  Dimension #1 - Withdrawal Potential - Risk 0. 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 Patient completed a UA on 6/1/18 which was negative for all tested substances.  Specific goals from treatment plan addressed this week:  Patient reported abstinence this week.  Patient was not requested to complete any UAs this week.   Effectiveness of strategies:  Strategies appear effective.   Dimension #2 - Biomedical - Risk 0. Patient reports no current health concerns and has access to medical care.   Specific goals from treatment plan addressed this week:  Patient reported no health concerns this week.   Effectiveness of strategies:  Strategies appear effective.   Dimension #3 - Emotional/Behavioral/Cognitive - Risk 2. Patient's medical record indicates diagnoses of adjustment disorder and ADHD (by history). Patient indicates emotional stress related to his mother's cancer prognosis, and identifies that his addiction and criminal behavior has caused problems in relationships.   Active interventions to stabilize mental health symptoms:  Patient reports taking medications as prescribed.   Specific goals from  treatment plan addressed this week:  Patient reported no changes to mental health symptoms. Patient reports he has not scheduled an appointment for psychotherapy yet.   Effectiveness of strategies:  Strategies appear effective.    Dimension #4 - Readiness for Change - Risk 1. Patient reports being motivated for change, but has previously been unable to maintain abstinence when facing probation violations and shelter time.   Specific goals from treatment plan addressed this week:  Patient attended 1/2 groups this week and had reported with work plan to attend Fridays and another day of the week, but then called in on 6/15/18 reporting that he would need to miss group due to work on that date.   Effectiveness of strategies:  Strategies appear effective, counselor to discuss treatment engagement with patient.   Dimension #5 - Relapse Potential - Risk 3. Patient reports periods of abstinence and multiple treatments in the past. Patient identifies difficulty coping with negative emotions and impulsivity.   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 Patient reported that his supervisor is also in recovery and discussed this concern with him.   Effectiveness of strategies:  Strategies appear effective.   Dimension #6 - Recovery Environment - Risk 3. Patient reports he is working part-time and living with his mother helping take care of her. Patient reports current recovery group attendance. Patient reports significant criminal justice history.   Specific goals from treatment plan addressed this week:  Patient reported starting a new job closer to his home. Patient reported that his  wanted to know about his most recent UA, and patient's  stated that the patient had reported that he had recently completed an observed UA through treatment, whereas the patient's last UA was on  6/1/18 and was not observed.   Effectiveness of strategies:  Strategies appear effective.   T) Treatment plan updated: no Patient notified and in agreement: no, patient has not signed plan yet.   Patient educated on Thinking.  Patient has completed 85 of 90 program hours at this time. Projected discharge date is 6/30/18. Current discharge plan is Recovery Maintenance.     Stephy Sanz [facility], [facility]  6/15/2018, 2:22 PM       Weekly Educational Topics Date   1. Dual Diagnoses, week 1 2/13, 2/14; 5/16   2. Dual Diagnoses, week 2 2/19, 2/21; 5/22   3. Stress Management 2/26, 2/27, 2/28; 6/1   4. Feelings/Emotions 6/5, 6/8   5. Thinking 3/12, 3/13, 3/14, 3/16; 6/13   6. Change 3/20, 3/21   7. Recovery Support 3/28, 3/30   8. Relationships/Communication 4/3, 4/4, 4/6   9. Addiction 101 4/9, 4/11, 4/13   10. Relapse Prevention 4/17, 4/18   11. Grief and Loss 4/23   12. Strengths 4/30, 5/2   13. Wellness 5/7   Seeking Safety     Mindfulness  3/16, 3/30, 4/6, 4/13, 6/1

## 2021-06-18 NOTE — PROGRESS NOTES
Weekly Progress Note   Gustavo Saleh  1970  [id number]      D) Pt attended 1/1 groups this week. No individual sessions were scheduled this week.   A) Staff facilitated groups and reviewed tx progress. Assessed for [facility]. R) No VAP needed at this time.     Any significant events, defines as events that impact patients relationship with others inside and outside of treatment: Patient reported court on 5/14/18, reports it went better than expected.   Indicate any changes or monitoring of physical or mental health problems: No  Indicate involvement by any outside supports: Yes: patient reports recovery group attendance.   IAPP reviewed and modified as needed: NA    Pt working on the following dimensions:  Dimension #1 - Withdrawal Potential - Risk 0. 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   Specific goals from treatment plan addressed this week:  Patient reported abstinence this week. Patient completed a UA on 6/1/18 which was negative for all tested substances.   Effectiveness of strategies:  Strategies appear effective.   Dimension #2 - Biomedical - Risk 0. Patient reports no current health concerns and has access to medical care.   Specific goals from treatment plan addressed this week:  Patient reported no health concerns this week.   Effectiveness of strategies:  Strategies appear effective.   Dimension #3 - Emotional/Behavioral/Cognitive - Risk 2. Patient's medical record indicates diagnoses of adjustment disorder and ADHD (by history).  Active interventions to stabilize mental health symptoms:  Patient reports taking medications as prescribed.   Specific goals from treatment plan addressed this week:  Patient reported no changes to mental health symptoms. Patient reported plan to find a therapist at a different location.   Effectiveness of strategies:  Strategies appear effective.    Dimension #4 - Readiness for Change  - Risk 1. Patient reports being motivated for change, but has previously been unable to maintain abstinence when facing probation violations and alf time.   Specific goals from treatment plan addressed this week:  Patient attended 1/1 groups, counselor and patient discussed that the patient will be attending Monday and Friday, and potentially making up missed days if able with work schedule.   Effectiveness of strategies:  Strategies appear effective.   Dimension #5 - Relapse Potential - Risk 3. Patient reports periods of abstinence and multiple treatments in the past. Patient identifies difficulty coping with negative emotions and impulsivity.   Specific goals from treatment plan addressed this week:  Patient reported that an old associate came to his work, and tried to handle things apppropriately.   Effectiveness of strategies:  Strategies appear effective.   Dimension #6 - Recovery Environment - Risk 3. Patient reports he is unemployed and living with his mother helping take care of her. Patient reports current recovery group attendance. Patient reports significant criminal justice history.   Specific goals from treatment plan addressed this week:  Patient reported continued to attend meetings and is working part time.   Effectiveness of strategies:  Strategies appear effective.   T) Treatment plan updated: no Patient notified and in agreement: no, patient has not signed plan yet.   Patient educated on Stress Management.  Patient has completed 77 of 90 program hours at this time. Projected discharge date is 6/25/18. Current discharge plan is Recovery Maintenance.     TATIANNA Valera, [facility]  6/1/2018, 2:43 PM       Weekly Educational Topics Date   1. Dual Diagnoses, week 1 2/13, 2/14; 5/16   2. Dual Diagnoses, week 2 2/19, 2/21; 5/22   3. Stress Management 2/26, 2/27, 2/28; 6/1   4. Feelings/Emotions    5. Thinking 3/12, 3/13, 3/14, 3/16   6. Change 3/20, 3/21   7. Recovery Support 3/28, 3/30   8.  Relationships/Communication 4/3, 4/4, 4/6   9. Addiction 101 4/9, 4/11, 4/13   10. Relapse Prevention 4/17, 4/18   11. Grief and Loss 4/23   12. Strengths 4/30, 5/2   13. Wellness 5/7   Seeking Safety     Mindfulness  3/16, 3/30, 4/6, 4/13, 6/1

## 2021-06-19 NOTE — PROGRESS NOTES
Gustavo Saleh Jr. attended 1 hours of group therapy today. Patient admitted to relapsing over the weekend; he processed this relapse in group. He was given a UA prior to the start of group to provide today at the lab.     7/30/2018 3:17 PM Vianca Roach

## 2021-06-19 NOTE — PROGRESS NOTES
Gustavo Saleh Jr. attended 3 hours of group therapy today.    Total group size of 11.    8/15/2018 12:12 PM Stephy Sanz
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
## 2021-06-19 NOTE — PROGRESS NOTES
Weekly Progress Note (7/27/18-8/3/18)  Gustavo Saleh  1970  [id number]      D) Pt attended 2/3 groups this week, with 1 excused absence. No individual sessions were scheduled this week.   A) Staff facilitated groups and reviewed tx progress. [facility] for [facility]. R) No VAP needed at this time.     Any significant events, defines as events that impact patients relationship with others inside and outside of treatment: None reported this week.   Indicate any changes or monitoring of physical or mental health problems: No  Indicate involvement by any outside supports: Yes: patient reports recovery group attendance.    IAPP reviewed and modified as needed: NA    Pt working on the following dimensions:  Dimension #1 - Withdrawal Potential - Risk 0. 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 Patient completed a UA on 6/1/18 and 7/11/18 which was negative for all tested substances.  Specific goals from treatment plan addressed this week:  Patient reported using alcohol and meth over the weekend. Patient was requested to take a UA on 7/30/18 and did not take it, but discussed use with primary counselor.   Effectiveness of strategies:  Staff to continue to monitor.   Dimension #2 - Biomedical - Risk 0. Patient reports no current health concerns and has access to medical care.   Specific goals from treatment plan addressed this week:  Patient reported no health concerns this week, but discussed that he is looking into getting set up with a dentist.   Effectiveness of strategies:  Strategies appear effective.   Dimension #3 - Emotional/Behavioral/Cognitive - Risk 2. Patient's medical record indicates diagnoses of adjustment disorder and ADHD (by history). Patient indicates emotional stress related to his mother's cancer prognosis, and identifies that his addiction and criminal behavior has caused problems in relationships.   Active  interventions to stabilize mental health symptoms:  Patient reports taking medications as prescribed.   Specific goals from treatment plan addressed this week:  Patient stable mental health this week, discussed how he hadn't been as mindful about his emotions and his thinking prior to relapse. Patient and counselor discussed mental health referral.   Effectiveness of strategies:  Strategies appear effective.    Dimension #4 - Readiness for Change - Risk 1. Patient reports being motivated for change, but has previously been unable to maintain abstinence when facing probation violations and intermediate time.   Specific goals from treatment plan addressed this week:  Patient attended 2/3 groups with 1 excused absence. Counselor and patient discussed attendance, patient reported he will be using medical transportation now, so he should be able to attend more consistently.    Effectiveness of strategies:  Strategies appear effective.    Dimension #5 - Relapse Potential - Risk 3. Patient reports periods of abstinence and multiple treatments in the past. Patient identifies difficulty coping with negative emotions and impulsivity.   Specific goals from treatment plan addressed this week:  Patient discussed relapse and that he called his sponsor immediately afterwards. Patient reported he has beating himself up about the relapse, but has not had urges or cravings to use.  Effectiveness of strategies:  Staff to continue to monitor.   Dimension #6 - Recovery Environment - Risk 3. Patient reports he is working part-time and living with his mother helping take care of her. Patient reports current recovery group attendance. Patient reports significant criminal justice history.   Specific goals from treatment plan addressed this week:  Patient reported that he has decided to cut back at work to focus more on his recovery. Patient to work on developing a weekly schedule over the next month. Patient discussed that he is working on  developing stronger relationships with people from recovery groups.   Effectiveness of strategies:  Strategies appear effective.   T) Treatment plan updated: No Patient notified and in agreement: NA  Patient educated on Strengths.  Patient has completed 101 of 90 program hours at this time, patient and counselor in agreement with extending time. Projected discharge date is 8/24/18. Current discharge plan is Recovery Maintenance.     Stephy Sanz [facility], [facility]   8/3/2018, 3:53 PM       Weekly Educational Topics Date   1. Dual Diagnoses, week 1 2/13, 2/14; 5/16   2. Dual Diagnoses, week 2 2/19, 2/21; 5/22   3. Stress Management 2/26, 2/27, 2/28; 6/1   4. Feelings/Emotions 6/5, 6/8   5. Thinking 3/12, 3/13, 3/14, 3/16; 6/13   6. Change 3/20, 3/21; 6/18, 6/19   7. Recovery Support 3/28, 3/30   8. Relationships/Communication 4/3, 4/4, 4/6; 7/6   9. Addiction 101 4/9, 4/11, 4/13; 7/11   10. Relapse Prevention 4/17, 4/18; 7/16   11. Grief and Loss 4/23, 7/20,    12. Strengths 4/30, 5/2; 7/30, 8/3   13. Wellness 5/7   Seeking Safety     Mindfulness  3/16, 3/30, 4/6, 4/13, 6/1, 7/6

## 2021-06-19 NOTE — PROGRESS NOTES
STEVEN. Counselor met with patient for 20 minutes to discuss treatment progress. A. Counselor inquired about patient's recent relapse and what the patient had been up to since last attending group. Counselor recommended that the patient begin keeping a weekly schedule and prioritizing time for his recovery and his mother's care, based off of the patient's values. Counselor also recommended that the patient consider what boundaries need to be put in place with his family and to potentially move into sober housing. Counselor in agreement with patient about extending treatment, discussed therapy option while in Recovery Maintenance. R. Patient discussed relapse, reported that he was around people that he used to associate with and he realizes now that it's not realistic to spend time with them and maintain his recovery. Patient reported he has been reaching out to his sponsor more and was honest at one of his home meetings. Patient in agreement to work on a weekly schedule and discussed that he has reduced hours at work. Patient reported wanting to extend treatment and that he is interested in therapy referral for after MICD treatment. CAITLIN. Counselor to update patient's expected discharge date and treatment plan based off of discussion.     TATIANNA Valera, [facility]  8/3/2018, 3:11 PM

## 2021-06-19 NOTE — PROGRESS NOTES
Weekly Progress Note (7/13/18--7/19/18)  Gustavo Saleh  1970  [id number]      D) Pt attended 1/2 groups this week, with 1 excused absence. No individual sessions were scheduled this week.   A) Staff facilitated groups and reviewed tx progress. Assessed for [facility]. R) No VAP needed at this time.     Any significant events, defines as events that impact patients relationship with others inside and outside of treatment: None reported this week.   Indicate any changes or monitoring of physical or mental health problems: No  Indicate involvement by any outside supports: Yes: patient reports recovery group attendance.    IAPP reviewed and modified as needed: NA    Pt working on the following dimensions:  Dimension #1 - Withdrawal Potential - Risk 0. 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 Patient completed a UA on 6/1/18 and 7/11/18 which was negative for all tested substances.  Specific goals from treatment plan addressed this week:  Patient reported abstinence this week.  Patient was not requested to complete a UA this week.   Effectiveness of strategies:  Strategies appear effective.   Dimension #2 - Biomedical - Risk 0. Patient reports no current health concerns and has access to medical care.   Specific goals from treatment plan addressed this week:  Patient reported no health concerns this week.   Effectiveness of strategies:  Strategies appear effective.   Dimension #3 - Emotional/Behavioral/Cognitive - Risk 2. Patient's medical record indicates diagnoses of adjustment disorder and ADHD (by history). Patient indicates emotional stress related to his mother's cancer prognosis, and identifies that his addiction and criminal behavior has caused problems in relationships.   Active interventions to stabilize mental health symptoms:  Patient reports taking medications as prescribed.   Specific goals from treatment plan addressed  this week:  Patient reported noticing he is more reflected in his thinking. Patient did not indicate any changes with finding a therapist.   Effectiveness of strategies:  Strategies appear effective.    Dimension #4 - Readiness for Change - Risk 1. Patient reports being motivated for change, but has previously been unable to maintain abstinence when facing probation violations and snf time.   Specific goals from treatment plan addressed this week:  Patient attended 1/2 groups this week with an excused absence.   Effectiveness of strategies:  Strategies appear effective.    Dimension #5 - Relapse Potential - Risk 3. Patient reports periods of abstinence and multiple treatments in the past. Patient identifies difficulty coping with negative emotions and impulsivity.   Specific goals from treatment plan addressed this week:  Patient discussed that a friend of his recently overdose and that she is blaming him. Patient discussed the conflict of wanting to be helpful to her, but also that he is aware that he needing to keep himself safe.   Effectiveness of strategies:  Strategies appear effective.   Dimension #6 - Recovery Environment - Risk 3. Patient reports he is working part-time and living with his mother helping take care of her. Patient reports current recovery group attendance. Patient reports significant criminal justice history.   Specific goals from treatment plan addressed this week:  Patient reported working, and that he paying off debts with his paycheck. Patient discussing being aware with having money and putting safety measures into place. Patient reports continuing to attend recovery meetings and meeting with sponsor.   Effectiveness of strategies:  Strategies appear effective.   T) Treatment plan updated: yes Patient notified and in agreement: Patient to sign plan on 7/20/18.   Patient educated on Relapse Prevention.  Patient has completed 96 of 90 program hours at this time. Projected discharge date  is 8/6/18. Current discharge plan is Recovery Maintenance.     TATIANNA Valera, [facility]  7/19/2018, 8:38 AM       Weekly Educational Topics Date   1. Dual Diagnoses, week 1 2/13, 2/14; 5/16   2. Dual Diagnoses, week 2 2/19, 2/21; 5/22   3. Stress Management 2/26, 2/27, 2/28; 6/1   4. Feelings/Emotions 6/5, 6/8   5. Thinking 3/12, 3/13, 3/14, 3/16; 6/13   6. Change 3/20, 3/21; 6/18, 6/19   7. Recovery Support 3/28, 3/30   8. Relationships/Communication 4/3, 4/4, 4/6; 7/6   9. Addiction 101 4/9, 4/11, 4/13; 7/11   10. Relapse Prevention 4/17, 4/18; 7/16   11. Grief and Loss 4/23   12. Strengths 4/30, 5/2   13. Wellness 5/7   Seeking Safety     Mindfulness  3/16, 3/30, 4/6, 4/13, 6/1, 7/6

## 2021-06-19 NOTE — PROGRESS NOTES
Weekly Progress Note (7/6/18--7/12/18)  Gustavo Saleh  1970  [id number]      D) Pt attended 2/2 groups this week, with no absences. Counselor met with patient 1:1 on 7/6/18.   A) Staff facilitated groups and reviewed tx progress. Assessed for [facility]. R) No VAP needed at this time.     Any significant events, defines as events that impact patients relationship with others inside and outside of treatment: None reported this week.   Indicate any changes or monitoring of physical or mental health problems: No  Indicate involvement by any outside supports: Yes: patient reports recovery group attendance.    IAPP reviewed and modified as needed: NA    Pt working on the following dimensions:  Dimension #1 - Withdrawal Potential - Risk 0. 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 Patient completed a UA on 6/1/18 which was negative for all tested substances.  Specific goals from treatment plan addressed this week:  Patient reported abstinence this week.  Patient completed a UA on 7/11/18 which was negative for all tested substances.  Effectiveness of strategies:  Strategies appear effective.   Dimension #2 - Biomedical - Risk 0. Patient reports no current health concerns and has access to medical care.   Specific goals from treatment plan addressed this week:  Patient reported no health concerns this week.   Effectiveness of strategies:  Strategies appear effective.   Dimension #3 - Emotional/Behavioral/Cognitive - Risk 2. Patient's medical record indicates diagnoses of adjustment disorder and ADHD (by history). Patient indicates emotional stress related to his mother's cancer prognosis, and identifies that his addiction and criminal behavior has caused problems in relationships.   Active interventions to stabilize mental health symptoms:  Patient reports taking medications as prescribed.   Specific goals from treatment plan addressed  this week:  Patient discussed working on accepting his mother's choices regarding cancer treatment, but acknowleding that he has his own feelings. Patient reports he has not made progress in establishing care with a therapist.   Effectiveness of strategies:  Strategies appear effective.    Dimension #4 - Readiness for Change - Risk 1. Patient reports being motivated for change, but has previously been unable to maintain abstinence when facing probation violations and half-way time.   Specific goals from treatment plan addressed this week:  Patient attended 2/2 groups this week, and communicated with counselor about needing to switch days. During 1:1, counselor and patient discussed the importance of communicating about attendance and attending at least 2x per week.   Effectiveness of strategies:  Strategies appear effective.    Dimension #5 - Relapse Potential - Risk 3. Patient reports periods of abstinence and multiple treatments in the past. Patient identifies difficulty coping with negative emotions and impulsivity.   Specific goals from treatment plan addressed this week:  Patient reported no use this past week, but discussed experiencing urges at various points throughout the day. Patient identified this with the habit of using.   Effectiveness of strategies:  Strategies appear effective.   Dimension #6 - Recovery Environment - Risk 3. Patient reports he is working part-time and living with his mother helping take care of her. Patient reports current recovery group attendance. Patient reports significant criminal justice history.   Specific goals from treatment plan addressed this week:  Patient discussed that his supervisor at work is also in recovery and how it is helpful and uncomfortable having someone who is holding him accountable. Patient reports continuing to attend recovery meetings and meeting with sponsor.   Effectiveness of strategies:  Strategies appear effective.   T) Treatment plan updated: no  Patient notified and in agreement: N/A   Patient educated on Addiction 101.  Patient has completed 94 of 90 program hours at this time. Projected discharge date is 8/6/18. Current discharge plan is Recovery Maintenance.     TATIANNA Valera, [facility]  7/12/2018, 9:46 AM       Weekly Educational Topics Date   1. Dual Diagnoses, week 1 2/13, 2/14; 5/16   2. Dual Diagnoses, week 2 2/19, 2/21; 5/22   3. Stress Management 2/26, 2/27, 2/28; 6/1   4. Feelings/Emotions 6/5, 6/8   5. Thinking 3/12, 3/13, 3/14, 3/16; 6/13   6. Change 3/20, 3/21; 6/18, 6/19   7. Recovery Support 3/28, 3/30   8. Relationships/Communication 4/3, 4/4, 4/6; 7/6   9. Addiction 101 4/9, 4/11, 4/13; 7/11   10. Relapse Prevention 4/17, 4/18   11. Grief and Loss 4/23   12. Strengths 4/30, 5/2   13. Wellness 5/7   Seeking Safety     Mindfulness  3/16, 3/30, 4/6, 4/13, 6/1, 7/6

## 2021-06-19 NOTE — PROGRESS NOTES
STEVEN. Counselor met with patient for 50 minutes to discuss treatment progress. A. Counselor inquired about patient's absences and reminded patient of expectation of attending 2x per week. Counselor discussed extending treatment to focus on immediate goals and to restabilize in recovery. Counselor encouraged patient to be aware of who he is doing things for and not looking to meet other's expectations. R. Patient discussed conflict with his relatives regarding his mother's care, and that he had to catch himself in being defensive towards others. Patient reported that he is trying to stay focused on his recovery and has started a new job working as  and that his supervisor is sober. Patient in agreement to extend treatment and identified needing to set goals. T. Counselor to update patient's treatment plan based off of discussion.     Patient treatment was reviewed. Changes were made. Patient was actively and directly involved in the treatment plan review and updates.     TATIANNA Valera, [facility]  7/6/2018, 3:35 PM

## 2021-06-19 NOTE — PROGRESS NOTES
Weekly Progress Note   Gustavo Saleh  1970  [id number]      D) Pt attended 1/1 groups this week, with 1 excused absence. No individual sessions were scheduled this week.   A) Staff facilitated groups and reviewed tx progress. Assessed for [facility]. R) No VAP needed at this time.     Any significant events, defines as events that impact patients relationship with others inside and outside of treatment: Patient reported on 8/15/18 that his mother was in a significant accident, which would prevent him from attending group on 8/17/18  Indicate any changes or monitoring of physical or mental health problems: No  Indicate involvement by any outside supports: Yes: patient reports recovery group attendance.    IAPP reviewed and modified as needed: NA    Pt working on the following dimensions:  Dimension #1 - Withdrawal Potential - Risk 0. 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 Patient completed a UA on 6/1/18 and 7/11/18 which was negative for all tested substances. Patient reported meth use the weekend of 8/3/18-8/4/18.  Specific goals from treatment plan addressed this week:  Patient reported no use this week. Patient was not requested to complete a UA this week.   Effectiveness of strategies:  Strategies appear effective.   Dimension #2 - Biomedical - Risk 0. Patient reports no current health concerns and has access to medical care.   Specific goals from treatment plan addressed this week:  Patient reported no health concerns this week.   Effectiveness of strategies:  Strategies appear effective.   Dimension #3 - Emotional/Behavioral/Cognitive - Risk 2. Patient's medical record indicates diagnoses of adjustment disorder and ADHD (by history). Patient indicates emotional stress related to his mother's cancer prognosis, and identifies that his addiction and criminal behavior has caused problems in relationships.   Active  "interventions to stabilize mental health symptoms:  Patient reports taking medications as prescribed.   Specific goals from treatment plan addressed this week:  Patient discussed needing to take a step back and be mindful about his recovery.   Effectiveness of strategies:  Strategies appear effective.    Dimension #4 - Readiness for Change - Risk 1. Patient reports being motivated for change, but has previously been unable to maintain abstinence when facing probation violations and care home time.   Specific goals from treatment plan addressed this week:  Patient attended 1/1 groups this week with 1 excused absence.   Effectiveness of strategies:  Strategies appear effective.    Dimension #5 - Relapse Potential - Risk 3. Patient reports periods of abstinence and multiple treatments in the past. Patient identifies difficulty coping with negative emotions and impulsivity.   Specific goals from treatment plan addressed this week:  Patient reported no use this week, but discussed having a former \"plug\" come around his house. Patient discussed trying to decide if this is someone who can stay in his life.   Effectiveness of strategies:  Strategies appear effective.   Dimension #6 - Recovery Environment - Risk 3. Patient reports he is working part-time and living with his mother helping take care of her. Patient reports current recovery group attendance. Patient reports significant criminal justice history.   Specific goals from treatment plan addressed this week:  Patient reports participating in sober activities and recovery groups, meeting with sponsor. Patient reports continuing to work part-time. Patient discussed trying to be there for his mom but also setting boundaries with other family members.   Effectiveness of strategies:  Strategies appear effective.   T) Treatment plan updated: No Patient notified and in agreement: NA  Patient educated on Dual Diagnosis I.  Patient has completed 107 of 90 program hours at this " time, patient and counselor in agreement with extending time. Projected discharge date is 8/24/18. Current discharge plan is Recovery Maintenance.     TATIANNA Valera, [facility]   8/18/2018, 2:27 PM       Weekly Educational Topics Date   1. Dual Diagnoses, week 1 2/13, 2/14; 5/16; 8/15   2. Dual Diagnoses, week 2 2/19, 2/21; 5/22   3. Stress Management 2/26, 2/27, 2/28; 6/1   4. Feelings/Emotions 6/5, 6/8   5. Thinking 3/12, 3/13, 3/14, 3/16; 6/13   6. Change 3/20, 3/21; 6/18, 6/19   7. Recovery Support 3/28, 3/30   8. Relationships/Communication 4/3, 4/4, 4/6; 7/6   9. Addiction 101 4/9, 4/11, 4/13; 7/11   10. Relapse Prevention 4/17, 4/18; 7/16   11. Grief and Loss 4/23, 7/20,    12. Strengths 4/30, 5/2; 7/30, 8/3   13. Wellness 5/7; 8/7   Seeking Safety     Mindfulness  3/16, 3/30, 4/6, 4/13, 6/1, 7/6

## 2021-06-19 NOTE — PROGRESS NOTES
Weekly Progress Note  Gustavo Saleh  1970  [id number]      D) Pt attended ZERO groups  this week with excused absences, patient communicated with staff regarding absences and plan to attend on 7/6/18.  A) Staff facilitated groups and reviewed tx progress. Assessed for [facility]. R) Unable to assess along six dimensions or for [facility] due to lack of attendance.   T)  Patient has completed 90 of 90 program hours at this time. Projected discharge date is 6/30/18. Current discharge plan is Recovery Maintenance.     TATIANNA Valera, [facility]  7/5/2018, 10:08 AM       Weekly Educational Topics Date   1. Dual Diagnoses, week 1 2/13, 2/14; 5/16   2. Dual Diagnoses, week 2 2/19, 2/21; 5/22   3. Stress Management 2/26, 2/27, 2/28; 6/1   4. Feelings/Emotions 6/5, 6/8   5. Thinking 3/12, 3/13, 3/14, 3/16; 6/13   6. Change 3/20, 3/21; 6/18, 6/19   7. Recovery Support 3/28, 3/30   8. Relationships/Communication 4/3, 4/4, 4/6   9. Addiction 101 4/9, 4/11, 4/13   10. Relapse Prevention 4/17, 4/18   11. Grief and Loss 4/23   12. Strengths 4/30, 5/2   13. Wellness 5/7   Seeking Safety     Mindfulness  3/16, 3/30, 4/6, 4/13, 6/1

## 2021-06-19 NOTE — PROGRESS NOTES
Weekly Progress Note (7/13/18--7/19/18)  Gustavo Saleh  1970  [id number]      D) Pt attended 1/2 groups this week, with 1 excused absence. No individual sessions were scheduled this week.   A) Staff facilitated groups and reviewed tx progress. Assessed for [facility]. R) No VAP needed at this time.     Any significant events, defines as events that impact patients relationship with others inside and outside of treatment: None reported this week.   Indicate any changes or monitoring of physical or mental health problems: No  Indicate involvement by any outside supports: Yes: patient reports recovery group attendance.    IAPP reviewed and modified as needed: NA    Pt working on the following dimensions:  Dimension #1 - Withdrawal Potential - Risk 0. 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 Patient completed a UA on 6/1/18 and 7/11/18 which was negative for all tested substances.  Specific goals from treatment plan addressed this week:  Patient reported abstinence this week.  Patient was not requested to complete a UA this week.   Effectiveness of strategies:  Strategies appear effective.   Dimension #2 - Biomedical - Risk 0. Patient reports no current health concerns and has access to medical care.   Specific goals from treatment plan addressed this week:  Patient reported no health concerns this week.   Effectiveness of strategies:  Strategies appear effective.   Dimension #3 - Emotional/Behavioral/Cognitive - Risk 2. Patient's medical record indicates diagnoses of adjustment disorder and ADHD (by history). Patient indicates emotional stress related to his mother's cancer prognosis, and identifies that his addiction and criminal behavior has caused problems in relationships.   Active interventions to stabilize mental health symptoms:  Patient reports taking medications as prescribed.   Specific goals from treatment plan addressed  this week:  Patient stable mental health this week. Patient did not indicate any changes with finding a therapist.   Effectiveness of strategies:  Strategies appear effective.    Dimension #4 - Readiness for Change - Risk 1. Patient reports being motivated for change, but has previously been unable to maintain abstinence when facing probation violations and [facility] time.   Specific goals from treatment plan addressed this week:  Patient attended 1/2 groups this week with an excused absence.   Effectiveness of strategies:  Strategies appear effective.    Dimension #5 - Relapse Potential - Risk 3. Patient reports periods of abstinence and multiple treatments in the past. Patient identifies difficulty coping with negative emotions and impulsivity.   Specific goals from treatment plan addressed this week:  Patient reports continued abstinence this week. Denies urges and cravings to use.   Effectiveness of strategies:  Strategies appear effective.   Dimension #6 - Recovery Environment - Risk 3. Patient reports he is working part-time and living with his mother helping take care of her. Patient reports current recovery group attendance. Patient reports significant criminal justice history.   Specific goals from treatment plan addressed this week:  Patient reported working, and that he paying off debts with his paycheck. Patient discussing being aware with having money and putting safety measures into place. Patient reports continuing to attend recovery meetings and meeting with sponsor.   Effectiveness of strategies:  Strategies appear effective.   T) Treatment plan updated: No Patient notified and in agreement: NA  Patient educated on Relapse Prevention.  Patient has completed 98 of 90 program hours at this time. Projected discharge date is 8/6/18. Current discharge plan is Recovery Maintenance.     Vianca Roach MA, LMFT, [facility]  7/26/2018, 8:55 AM       Weekly Educational Topics Date   1. Dual Diagnoses, week 1 2/13,  2/14; 5/16   2. Dual Diagnoses, week 2 2/19, 2/21; 5/22   3. Stress Management 2/26, 2/27, 2/28; 6/1   4. Feelings/Emotions 6/5, 6/8   5. Thinking 3/12, 3/13, 3/14, 3/16; 6/13   6. Change 3/20, 3/21; 6/18, 6/19   7. Recovery Support 3/28, 3/30   8. Relationships/Communication 4/3, 4/4, 4/6; 7/6   9. Addiction 101 4/9, 4/11, 4/13; 7/11   10. Relapse Prevention 4/17, 4/18; 7/16   11. Grief and Loss 4/23, 7/20   12. Strengths 4/30, 5/2   13. Wellness 5/7   Seeking Safety     Mindfulness  3/16, 3/30, 4/6, 4/13, 6/1, 7/6

## 2021-06-19 NOTE — PROGRESS NOTES
Weekly Progress Note   Gustavo Saleh  1970  [id number]      D) Pt attended 1/1 groups this week, with no absences. No individual sessions were scheduled this week.   A) Staff facilitated groups and reviewed tx progress. Assessed for [facility]. R) No VAP needed at this time.     Any significant events, defines as events that impact patients relationship with others inside and outside of treatment: None reported this week.   Indicate any changes or monitoring of physical or mental health problems: No  Indicate involvement by any outside supports: Yes: patient reports recovery group attendance.    IAPP reviewed and modified as needed: NA    Pt working on the following dimensions:  Dimension #1 - Withdrawal Potential - Risk 0. 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 Patient completed a UA on 6/1/18 and 7/11/18 which was negative for all tested substances. Patient reported meth use the weekend of 8/3/18-8/4/18  Specific goals from treatment plan addressed this week:  Patient reported no use this week. Patient was not requested to complete a UA this week.   Effectiveness of strategies:  Strategies appear effective.   Dimension #2 - Biomedical - Risk 0. Patient reports no current health concerns and has access to medical care.   Specific goals from treatment plan addressed this week:  Patient discussed problems with retainer, but no immediate health concerns.   Effectiveness of strategies:  Strategies appear effective.   Dimension #3 - Emotional/Behavioral/Cognitive - Risk 2. Patient's medical record indicates diagnoses of adjustment disorder and ADHD (by history). Patient indicates emotional stress related to his mother's cancer prognosis, and identifies that his addiction and criminal behavior has caused problems in relationships.   Active interventions to stabilize mental health symptoms:  Patient reports taking medications as  prescribed.   Specific goals from treatment plan addressed this week:  Patient reported that he has been beating himself up regarding relapse, but is also trying to stay focused on his recovery.   Effectiveness of strategies:  Strategies appear effective.    Dimension #4 - Readiness for Change - Risk 1. Patient reports being motivated for change, but has previously been unable to maintain abstinence when facing probation violations and care home time.   Specific goals from treatment plan addressed this week:  Patient attended 1/1 groups this week with no absences.    Effectiveness of strategies:  Strategies appear effective.    Dimension #5 - Relapse Potential - Risk 3. Patient reports periods of abstinence and multiple treatments in the past. Patient identifies difficulty coping with negative emotions and impulsivity.   Specific goals from treatment plan addressed this week:  Patient reported no urges this week, but reported that he has been thinking more about what led to relapse.   Effectiveness of strategies:  Staff to continue to monitor.   Dimension #6 - Recovery Environment - Risk 3. Patient reports he is working part-time and living with his mother helping take care of her. Patient reports current recovery group attendance. Patient reports significant criminal justice history.   Specific goals from treatment plan addressed this week:  Patient reports participating in sober activities and recovery groups, meeting with sponsor. Patient reports continuing to work part-time.   Effectiveness of strategies:  Strategies appear effective.   T) Treatment plan updated: No Patient notified and in agreement: NA  Patient educated on Wellness.  Patient has completed 104 of 90 program hours at this time, patient and counselor in agreement with extending time. Projected discharge date is 8/24/18. Current discharge plan is Recovery Maintenance.     TATIANNA Valera, [facility]   8/9/2018, 2:52 PM       Weekly Educational Topics  Date   1. Dual Diagnoses, week 1 2/13, 2/14; 5/16   2. Dual Diagnoses, week 2 2/19, 2/21; 5/22   3. Stress Management 2/26, 2/27, 2/28; 6/1   4. Feelings/Emotions 6/5, 6/8   5. Thinking 3/12, 3/13, 3/14, 3/16; 6/13   6. Change 3/20, 3/21; 6/18, 6/19   7. Recovery Support 3/28, 3/30   8. Relationships/Communication 4/3, 4/4, 4/6; 7/6   9. Addiction 101 4/9, 4/11, 4/13; 7/11   10. Relapse Prevention 4/17, 4/18; 7/16   11. Grief and Loss 4/23, 7/20,    12. Strengths 4/30, 5/2; 7/30, 8/3   13. Wellness 5/7; 8/7   Seeking Safety     Mindfulness  3/16, 3/30, 4/6, 4/13, 6/1, 7/6

## 2021-06-19 NOTE — PROGRESS NOTES
Gustavo Saleh Jr. attended 2 hours of group therapy today. Patient received copy of updated treatment plan and signed with no additional changes.     7/20/2018 2:03 PM Stephy Sanz
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
## 2021-06-20 NOTE — PROGRESS NOTES
Gustavo Saleh Jr. attended 2 hours of group therapy today. Counselor met with patient briefly before group and discussed that the expectation going forward would be attending at least 1 group per week for the next six weeks and completing expectations of treatment plan and updated treatment expectations agreement. Patient reports agreement with plan.     Total group size of 7.    9/14/2018 12:42 PM Stephy Sanz
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
## 2021-06-20 NOTE — PROGRESS NOTES
Weekly Progress Note  Gustavo Saleh  1970  [id number]      D) Pt attended ZERO groups  this week with excused absences. A) Staff facilitated groups and reviewed tx progress. Assessed for [facility]. R) Unable to assess along six dimensions or for VA due to lack of attendance.   T) Patient has completed 107 of 90 program hours at this time, patient and counselor in agreement with extending time. Projected discharge date is 8/24/18. Current discharge plan is Recovery Maintenance.     TATIANNA Valera, [facility]   8/24/2018, 1:52 PM       Weekly Educational Topics Date   1. Dual Diagnoses, week 1 2/13, 2/14; 5/16; 8/15   2. Dual Diagnoses, week 2 2/19, 2/21; 5/22   3. Stress Management 2/26, 2/27, 2/28; 6/1   4. Feelings/Emotions 6/5, 6/8   5. Thinking 3/12, 3/13, 3/14, 3/16; 6/13   6. Change 3/20, 3/21; 6/18, 6/19   7. Recovery Support 3/28, 3/30   8. Relationships/Communication 4/3, 4/4, 4/6; 7/6   9. Addiction 101 4/9, 4/11, 4/13; 7/11   10. Relapse Prevention 4/17, 4/18; 7/16   11. Grief and Loss 4/23, 7/20,    12. Strengths 4/30, 5/2; 7/30, 8/3   13. Wellness 5/7; 8/7   Seeking Safety     Mindfulness  3/16, 3/30, 4/6, 4/13, 6/1, 7/6

## 2021-06-20 NOTE — PROGRESS NOTES
Weekly Progress Note  Gustavo Saleh  1970  [id number]      D) Pt attended ZERO groups  this week with unexcused absences. A) Staff facilitated groups and reviewed tx progress. Assessed for [facility]. R) Unable to assess along six dimensions or for VA due to lack of attendance.   T)  Patient has completed 109 of 90 program hours at this time, patient and counselor in agreement with extending time. Projected discharge date is TBD. Current discharge plan is Recovery Maintenance. If patient does not present in the next week, patient will be discharged AWOL.    Stephy Sanz, MPS, [facility]   9/7/2018, 3:03 PM       Weekly Educational Topics Date   1. Dual Diagnoses, week 1 2/13, 2/14; 5/16; 8/15   2. Dual Diagnoses, week 2 2/19, 2/21; 5/22   3. Stress Management 2/26, 2/27, 2/28; 6/1; 8/28   4. Feelings/Emotions 6/5, 6/8   5. Thinking 3/12, 3/13, 3/14, 3/16; 6/13   6. Change 3/20, 3/21; 6/18, 6/19   7. Recovery Support 3/28, 3/30   8. Relationships/Communication 4/3, 4/4, 4/6; 7/6   9. Addiction 101 4/9, 4/11, 4/13; 7/11   10. Relapse Prevention 4/17, 4/18; 7/16   11. Grief and Loss 4/23, 7/20,    12. Strengths 4/30, 5/2; 7/30, 8/3   13. Wellness 5/7; 8/7   Seeking Safety     Mindfulness  3/16, 3/30, 4/6, 4/13, 6/1, 7/6

## 2021-06-20 NOTE — PROGRESS NOTES
Weekly Progress Note   Gustavo Saleh  1970  [id number]      D) Pt attended 1/1 groups this week. An individual session was scheduled for 8/30/18 but patient did not attend.   A) Staff facilitated groups and reviewed tx progress. Assessed for [facility]. R) No VAP needed at this time.     Any significant events, defines as events that impact patients relationship with others inside and outside of treatment: Patient reported on 8/23/18 that his mother had a stroke and that he is significantly involved with her care.   Indicate any changes or monitoring of physical or mental health problems: No  Indicate involvement by any outside supports: Yes: patient reports recovery group attendance.    IAPP reviewed and modified as needed: NA    Pt working on the following dimensions:  Dimension #1 - Withdrawal Potential - Risk 0. 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 Patient completed a UA on 6/1/18 and 7/11/18 which was negative for all tested substances. Patient reported meth use the weekend of 8/3/18-8/4/18.  Specific goals from treatment plan addressed this week:  Patient reported no use this week. Patient was requested to complete a UA on 8/29/18 but patient did not complete it.   Effectiveness of strategies:  Counselor to discuss missed UA with patient.   Dimension #2 - Biomedical - Risk 0. Patient reports no current health concerns and has access to medical care.   Specific goals from treatment plan addressed this week:  Patient reported no health concerns this week.   Effectiveness of strategies:  Strategies appear effective.   Dimension #3 - Emotional/Behavioral/Cognitive - Risk 2. Patient's medical record indicates diagnoses of adjustment disorder and ADHD (by history). Patient indicates emotional stress related to his mother's cancer prognosis, and identifies that his addiction and criminal behavior has caused problems in  relationships.   Active interventions to stabilize mental health symptoms:  Patient reports taking medications as prescribed.   Specific goals from treatment plan addressed this week:  Patient discussed how his mother's health is effecting him, patient was encouraged to practice self-care.   Effectiveness of strategies:  Strategies appear effective.    Dimension #4 - Readiness for Change - Risk 1. Patient reports being motivated for change, but has previously been unable to maintain abstinence when facing probation violations and [facility] time.   Specific goals from treatment plan addressed this week:  Patient attended 1/1 groups this week but did not attend scheduled 1:1.   Effectiveness of strategies:  Counselor to discuss treatment engagement with patient.   Dimension #5 - Relapse Potential - Risk 3. Patient reports periods of abstinence and multiple treatments in the past. Patient identifies difficulty coping with negative emotions and impulsivity.   Specific goals from treatment plan addressed this week:  Patient reported no use this week, but some urges.   Effectiveness of strategies:  Strategies appear effective.   Dimension #6 - Recovery Environment - Risk 3. Patient reports he is working part-time and living with his mother helping take care of her. Patient reports current recovery group attendance. Patient reports significant criminal justice history.   Specific goals from treatment plan addressed this week:  Patient reports participating in sober activities and recovery groups, meeting with sponsor. Patient reports the majority of his time this past week has been being at the hospital with his mother.   Effectiveness of strategies:  Strategies appear effective.   T) Treatment plan updated: No Patient notified and in agreement: NA  Patient educated on Stress Management.  Patient has completed 109 of 90 program hours at this time, patient and counselor in agreement with extending time. Projected discharge  date is TBD. Current discharge plan is Recovery Maintenance.     TATIANNA Valera, [facility]   8/30/2018, 11:44 AM       Weekly Educational Topics Date   1. Dual Diagnoses, week 1 2/13, 2/14; 5/16; 8/15   2. Dual Diagnoses, week 2 2/19, 2/21; 5/22   3. Stress Management 2/26, 2/27, 2/28; 6/1; 8/28   4. Feelings/Emotions 6/5, 6/8   5. Thinking 3/12, 3/13, 3/14, 3/16; 6/13   6. Change 3/20, 3/21; 6/18, 6/19   7. Recovery Support 3/28, 3/30   8. Relationships/Communication 4/3, 4/4, 4/6; 7/6   9. Addiction 101 4/9, 4/11, 4/13; 7/11   10. Relapse Prevention 4/17, 4/18; 7/16   11. Grief and Loss 4/23, 7/20,    12. Strengths 4/30, 5/2; 7/30, 8/3   13. Wellness 5/7; 8/7   Seeking Safety     Mindfulness  3/16, 3/30, 4/6, 4/13, 6/1, 7/6

## 2021-06-20 NOTE — PROGRESS NOTES
[facility] Attendance and Expectation Agreement  I, Gustavo YOUNG Therese Rivera, agree to following:    I will attend at least 1 day of group per week. I understand that if I am not able to do this I may be discharged from the program, unless there is a significant reason for my absence.    I will complete all requested UAs and understand that not completing will be considered a positive UA and may be grounds for discharge from program.     I will arrive to group on time and inform counselor of any circumstances that may prevent me from getting to group on time.     I will schedule any and all appointments outside of group times. If there is an unavoidable conflict, I will discuss this with my counselor.     I will complete all homework on time and maintain engagement and participation in group.     I will not use my cell phone in group and will excuse myself if I need to take a call or reply back to a text message.     I will not leave group until break time, unless it is absolutely necessary.     I will maintain abstinence throughout my participation. I understand that in I engage it substance use I will be recommended to a higher level of care.     If I am unable to comply with these expectations, I understand that this behavior may result in a discharge from the [facility] OP program.      Patient Signature: _____________________________            Date: _____________          Staff Signature: TATIANNA Valera, [facility]             Date: 9/14/2018, 9:28 AM

## 2021-06-20 NOTE — PROGRESS NOTES
Weekly Progress Note   Gustavo Saleh  1970  [id number]      D) Pt attended 1/1 groups this week (on 9/14/18), counselor met with patient briefly during this time.   A) Staff facilitated groups and reviewed tx progress. Assessed for VA. R) No VAP needed at this time.     Any significant events, defines as events that impact patients relationship with others inside and outside of treatment: Patient reported that he is currently living with his aunt, moved from mother's house.   Indicate any changes or monitoring of physical or mental health problems: No  Indicate involvement by any outside supports: Yes: patient reports recovery group attendance.    IAPP reviewed and modified as needed: NA    Pt working on the following dimensions:  Dimension #1 - Withdrawal Potential - Risk 0. 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 Patient completed a UA on 6/1/18 and 7/11/18 which was negative for all tested substances. Patient reported meth use the weekend of 8/3/18-8/4/18.  Specific goals from treatment plan addressed this week:  Patient reported no use this week. Patient was requested to complete a UA on 9/14/18, which was negative for all tested substances. Patient's updated treatment participation includes that if the patient is requested to complete a UA and does not do so, this may be grounds for discharge.   Effectiveness of strategies:  Strategies appear effective.   Dimension #2 - Biomedical - Risk 0. Patient reports no current health concerns and has access to medical care.   Specific goals from treatment plan addressed this week:  Patient reported no health concerns this week.   Effectiveness of strategies:  Strategies appear effective.   Dimension #3 - Emotional/Behavioral/Cognitive - Risk 2. Patient's medical record indicates diagnoses of adjustment disorder and ADHD (by history). Patient indicates emotional stress related  to his mother's cancer prognosis, and identifies that his addiction and criminal behavior has caused problems in relationships.   Active interventions to stabilize mental health symptoms:  Patient reports taking medications as prescribed.   Specific goals from treatment plan addressed this week:  Patient identified that he is trying to hold boundaries with his mother so that he can focus on his recovery, and discussed the emotional difficulty of this.   Effectiveness of strategies:  Strategies appear effective.    Dimension #4 - Readiness for Change - Risk 1. Patient reports being motivated for change, but has previously been unable to maintain abstinence when facing probation violations and nursing home time.   Specific goals from treatment plan addressed this week:  Patient attended 1/2 groups this week so far. Patient's updated treatment agreement and treatment plan is that the patient is required to attend 1x per week and that if he does not attend or provide a significant reason that he was unable to attend, this may be grounds for discharge.   Effectiveness of strategies:  Strategies appear effective.   Dimension #5 - Relapse Potential - Risk 3. Patient reports periods of abstinence and multiple treatments in the past. Patient identifies difficulty coping with negative emotions and impulsivity.   Specific goals from treatment plan addressed this week:  Patient reported no use this week, but some urges.   Effectiveness of strategies:  Strategies appear effective.   Dimension #6 - Recovery Environment - Risk 3. Patient reports he is working part-time and that he is now staying with a family member instead of with his mother. Patient reports current recovery group attendance. Patient reports significant criminal justice history.   Specific goals from treatment plan addressed this week:  Patient reports participating in sober activities and recovery groups, meeting with sponsor. Patient discussed that the family member  he is living with now is helping to keep him accountable.   Effectiveness of strategies:  Strategies appear effective.   T) Treatment plan updated: yes Patient notified and in agreement: yes  Patient educated on Thinking.  Patient has completed 111 of 90 program hours at this time, patient and counselor in agreement with extending time. Projected discharge date is 10/31/18. Current discharge plan is supports in the community.      Stephy Sanz [facility], [facility]   9/20/2018, 8:49 AM       Weekly Educational Topics Date   1. Dual Diagnoses, week 1 2/13, 2/14; 5/16; 8/15   2. Dual Diagnoses, week 2 2/19, 2/21; 5/22   3. Stress Management 2/26, 2/27, 2/28; 6/1; 8/28   4. Feelings/Emotions 6/5, 6/8   5. Thinking 3/12, 3/13, 3/14, 3/16; 6/13; 9/14   6. Change 3/20, 3/21; 6/18, 6/19   7. Recovery Support 3/28, 3/30   8. Relationships/Communication 4/3, 4/4, 4/6; 7/6   9. Addiction 101 4/9, 4/11, 4/13; 7/11   10. Relapse Prevention 4/17, 4/18; 7/16   11. Grief and Loss 4/23, 7/20,    12. Strengths 4/30, 5/2; 7/30, 8/3   13. Wellness 5/7; 8/7   Seeking Safety     Mindfulness  3/16, 3/30, 4/6, 4/13, 6/1, 7/6; 9/14

## 2021-06-20 NOTE — PROGRESS NOTES
Gustavo aSleh Jr. attended 2 hours of group therapy today.    Total group size of 7.    8/28/2018 1:37 PM Stephy Sanz

## 2021-06-24 NOTE — PROGRESS NOTES
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 on probation with [facility]. Patient has several past legals.       Client Choice/Exceptions     Would you like services specific to language, age, gender, culture, Protestant preference, race, ethnicity, sexual orientation or disability?  no    If yes, specify:      What particular treatment choices and options would you like to have?  [facility] out[facility]    Do you have a preference for a particular treatment program?  [facility]      .    DSM-V Criteria for Substance Abuse  Instructions  Determine whether the client currently meets the criteria for a Substance Use Disorder using the diagnostic criteria in the DSM-V, pp. 481-589. Current means during the most recent 12 months outside a facility that controls access to substances.    Category of substance Severity ICD-10 Code/DSM V Code   [x]  Alcohol Use Disorder [] Mild  [x] Moderate  [] Severe [] (F10.10) (305.00)  [x] (F10.20) (303.90)  [] (F10.20) (303.90)   []  Cannabis Use Disorder [] Mild  [] Moderate  [] Severe [] (F12.10) (305.20)  [] (F12.20) (304.30)  [] (F12.20) (304.30)   [] Hallucinogen Use Disorder [] Mild  [] Moderate  [] Severe [] (F16.10) (305.30)  [] (F16.20) (304.50)  [] (F16.20) (304.50)   []  Inhalant Use Disorder [] Mild  []  Moderate  [] Severe [] (F18.10) (305.90)  [] (F18.20) (304.60)  [] (F18.20) (304.60)   []  Opioid Use Disorder [] Mild  [] Moderate  [] Severe [] (F11.10) (305.50)  [] (F11.20) (304.00)  [] (F11.20) (304.00)   []  Sedative, Hypnotic, or Anxiolytic Use Disorder [] Mild  [] Moderate  [] Severe [] (F13.10) (305.40)   [] (F13.20) (304.10)  [] (F13.20) (304.10)   [x]  Stimulant Related Disorders [] Mild  [] Moderate  [x] Severe [] (F15.10) (305.70) Amphetamine type substance  [] (F14.10) (305.60) Cocaine  [] (F15.10) (305.70) Other or unspecified stimulant  [] (F15.20) (304.40) Amphetamine type substance  [] (F14.20) (304.20) Cocaine  [] (F15.20) (304.40) Other or unspecified stimulant  [x] (F15.20) (304.40) Amphetamine type substance  [] (F14.20) (304.20) Cocaine  [] (F15.20) (304.40) Other or unspecified stimulant   []  Tobacco use Disorder [] Mild  [] Moderate  [] Severe [] (Z72.0) (305.1)  [] (F17.200) (305.1)  [] (F17.200) (305.1)   []  Other (or unknown) Substance Use Disorder [] Mild  [] Moderate  [] Severe [] (F19.10) (305.90)  [] (F19.20) (304.90)  [] (F19.20) (304.90)       Suggested Level of Care Necessary for Recovery  []  Inpatient  []  Extended Care []  Residential [x]  Outpatient  []  None            Collateral Contact Summary   Number of contacts made:  1  Contact with referring person:  yes     If court related records were reviewed, summarize here:  NA     [x]   Information from collateral contacts supported/largely agreed with information from the client and associated risk ratings.   []   Information from collateral contacts was significantly different from information from the client and lead to different risk ratings.      Summarize new information here:      Rule 25 Assessment Summary and Plan   's Recommendation    Based on the information gathered in this assessment and from collateral information, the client meets DSM-V criteria for Stimulant Use Disorder, Severe, (F15.20) (304.40)  Amphetamine type substance and Alcohol Use Disorder, Moderate, (F10.20) (303.90).    -Outpatient treatment program.  -Follow recommendation of treatment program.  -Abstain from use of mood altering substances not prescribed.  -Consider establishing mental health services.  -Follow recommendations of probation.  -Remain law abiding.    This assessment can be updated with additional information within a 6 month period.      Collateral Contacts      Name    Reyna Teague Relationship    [facility] Phone Number    [phone] Releases    yes       Information Provided:      LETTY 3/14/19 @ 9:08AM  Voicemail received at 2:55pm on 3/14/19- Had drug testing on 3/8/19 that was negative. Self-reported methamphetamine use on 2/26/19. Test on 2/7/19 was positive for methamphetamine. Self-reported meth use on 2/5/19. Seems to be struggling. On and off use since being in treatment. Had a little bit of sobriety. Good standing with probation. Reporting as directed.     Collateral Contacts     Name    Stephy Sanz   Relationship    Counselor Phone Number    NA [facility]    LANCE       Information Provided:      Consulted with counselor regarding patient's previous experience in [facility] program.    Collateral Contacts     Name    Epic Chart Review   Relationship    NA Phone Number    NA Releases    NA       Information Provided:      Epic chart reviewed.      A problematic pattern of alcohol/drug use leading to clinically significant impairment or distress, as manifested by at least two of the following, occurring within a 12-month period:          Specify if: In early remission:  After full criteria for alcohol/drug use disorder were previously met, none of the criteria for alcohol/drug use disorder have been met for at least 3 months but for less than 12 months (with the exception that Criterion A4,  Craving or a strong desire or urge to use alcohol/drug  may be met).     In sustained remission:   After full criteria for  alcohol use disorder were previously met, none of the criteria for alcohol/drug use disorder have been met at any time during a period of 12 months or longer (with the exception that Criterion A4,  Craving or strong desire or urge to use alcohol/drug  may be met).   Specify if:   This additional specifier is used if the individual is in an environment where access to alcohol is restricted.    Mild: Presence of 2-3 symptoms  Moderate: Presence of 4-5 symptoms  Severe: Presence of 6 or more symptoms          Staff Name and Title: Anayeli Higgins  Date:  3/12/2019  Time:  10:15 AM

## 2021-06-25 NOTE — PROGRESS NOTES
Individual Treatment Plan    Patient  Name: Gustavo Saleh Jr.  MRN: [id number]   : 1970  Admit Date: 3/19/2019  Date of Initial Service Plan: 3/19/2019  Tentative Discharge Date: 19    Diagnosis: Stimulant Use Disorder, Severe, (F15.20) (304.40) Amphetamine type substance and Alcohol Use Disorder, Moderate, (F10.20) (303.90).    Counselor: Anayeli Higgins      Dimension 1: Acute Intoxication/Withdrawal Potential, Risk level: 0    Problem Statement from Comprehensive Assessment: on 3/19/2019  Patient reports last use of methamphetamine and alcohol on 19. Patient denies recent withdrawal symptoms.    Problem: Methamphetamine and Alcohol Use Disorder  Goal: Patient to maintain abstinence throughout outpatient treatment.   Must be reached to complete treatment? Yes  Methods/Strategies (must include amount and frequency):   1. Patient to report any substance/alcohol use to counselor to determine if any changes need to be made to address withdrawal symptoms.   2. Patient to complete any requested UAs.   Target Date: 19  Completion Date:     Dimension 2: Biomedical Conditions/Complications, Risk level: 0    Problem Statement from Comprehensive Assessment: on 3/19/2019  Patient reports stable health. Patient has [facility] insurance. Patient does not have primary care provider. Patient is able to seek cares as needed    Problem: Stable health.  Goal: Patient to maintain stable health throughout outpatient treatment.   Must be reached to complete treatment? No  Methods/Strategies (must include amount and frequency):   1. Patient to report any changes to physical health to counselor.   2. Patient to attend all scheduled doctor s appointments.   3. Patient to take medications as prescribed.   Target Date: 19  Completion Date:     Problem: Patient reports current tobacco use.   Goal: Patient to receive information about smoking cessation.   Must be reached to complete treatment?  No  Methods/Strategies (must include amount and frequency):   1. Staff to provide patient with nicotine cessation information and help on how to quit use.   2. Patient to report any progress on stopping nicotine use to staff.   Target Date: 06/17/19  Completion Date:     Dimension 3: Emotional/Behavioral/Cognitive, Risk level: 2    Problem Statement from Comprehensive Assessment: on 3/19/2019:  Patient has concerns about depression, ADHD, OCD, grief, and bi-polar. Patient does not have mental health care provider. Patient reports recently experiencing mental health symptoms. Patient denies suicidal ideation. Patient has suicide attempt in 2004.    Problem: Self-reports depression, ADHD, OCD, grief, and bi-polar.  Goal: Stable mental health.  Must be reached to complete treatment? No  Methods/Strategies (must include amount and frequency):   1. Patient to begin using coping skills learned in therapeutic group (such as grounding, breathing, thought challenging, mindfulness, etc), and share in daily check-in any benefits or challenges that you experience using these skills.  Target Date: 06/17/19  Completion Date:       Dimension 4: Readiness to Change, Risk level 0    Problem Statement from Comprehensive Assessment: on 3/19/2019:  Patient verbalizes a readiness and willingness for change.    Problem: Ongoing motivation  Goal: Patient to increase motivation towards recovery by participating in outpatient programming.   Must be reached to complete treatment? Yes  Methods/Strategies (must include amount and frequency):   1. Patient to attend 4, 3-hour groups per week and all scheduled 1:1s.  2. Patient will contact staff if unable to attend   Target Date: 06/17/19  Completion Date:     Dimension 5: Relapse/Continued Use/Continued Problem Potential, Risk level: 3    Problem Statement from Comprehensive Assessment: on 3/19/2019:  Patient lacks healthy, positive coping skills. Patient lacks skills to prevent relapse.  Patient may not recognize impact substance use has on mental health. Patient has had several treatment episodes. Patient has had significant periods of sobriety in the past, mostly while incarcerated.    Problem: Continued use.  Goal: Develop relapse prevention skills  Must be reached to complete treatment? Yes  Methods/Strategies (must include amount and frequency):   Patient to share in daily check-in any urges and addictive thinking to better understand his pattern of use and to prevent relapse in the future.   Target Date: 06/17/19  Completion Date:     Dimension 6: Recovery Environment, Risk level: 2    Problem Statement from Comprehensive Assessment: on 3/19/2019:  Patient is employed. Patient has stable housing. Patient has poor boundaries with using individuals. Patient has support system. Patient is currently on probation with [facility]. Patient has several past legals.    Problem: [facility] Probation.  Goal: Complete probation.  Must be reached to complete treatment? No  Methods/Strategies (must include amount and frequency): Follow recommendations.  Target Date: 06/17/19  Completion Date:     Resources  Resources to which the patient is being referred for problems when problems are to be addressed concurrently by another provider:         By signing this document, I am acknowledging that I was actively and directly involved in the development of my treatment plan.           Patient  Signature_________________________________________         Date__________________        Staff Signature  Anayeli Higgins    Date: 3/19/2019,

## 2021-06-25 NOTE — PROGRESS NOTES
"Intake Note:     D) Gustavo Saleh Jr. is a 48 y.o.   White or  male who is referred to [facility] OP via Self with funding from [employer]. Patient orientated x 3. Patient meets criteria for Stimulant Use Disorder, Severe, (F15.20) (304.40) Amphetamine type substance and Alcohol Use Disorder, Moderate, (F10.20) (303.90).  Patient appears appropriate for [facility] OP.   A) Met with patient for 45 minutes.  Completed intake assessment and preliminary paperwork. Patient was given and explained counselor & supervisor license number and contact info, Patient Bill of Rights, program rules/regulations, Program Abuse Prevention Plan, confidentiality & HIPPA policies, grievance procedure, presented ROIs, TB & HIV/AIDS policies & resources, and Vulnerable Adult policy.   Conducted Vulnerable Adult Assessment.   R)No special Vulnerable Adult needed at this time.  Patient signed and agreed to counselor & supervisor license number and contact info., Patient Bill of Rights, group rules/regulations, Program Abuse Prevention Plan, confidentiality & HIPPA policies, grievance procedure,  ROIs, TB & HIV/AIDS policies & resources, and Vulnerable Adult policy. Patient scored low risk on C-SSRS screen. Patient denied suicidal ideation/intent/plan/means at this time.     Opioid Use Disorder: No   Provided \"Options for Opioid Treatment in [place] and Overdose Prevention\" NA     Dimension #1 - Withdrawal Potential - Risk 0. Patient reports last use of methamphetamine and alcohol on 2/19/19. Patient denies recent withdrawal symptoms.    Dimension #2 - Biomedical - Risk 0. Patient reports stable health. Patient has [employer] insurance. Patient does not have primary care provider. Patient is able to seek cares as needed    Dimension #3 - Emotional , Behavioral and Cognitive - Risk 2. Patient has concerns about depression, ADHD, OCD, grief, and bi-polar. Patient does not have mental health care provider. Patient reports " recently experiencing mental health symptoms. Patient denies suicidal ideation. Patient has suicide attempt in 2004.     Dimension #4 - Readiness for Change - Risk 0. Patient verbalizes a readiness and willingness for change.    Dimension #5 - Relapse Potential - Risk 3. Patient lacks healthy, positive coping skills. Patient lacks skills to prevent relapse. Patient may not recognize impact substance use has on mental health. Patient has had several treatment episodes. Patient has had significant periods of sobriety in the past, mostly while incarcerated.    Dimension #6 - Recovery Environment - Risk 2. Patient is employed. Patient has stable housing. Patient has poor boundaries with using individuals. Patient has support system. Patient is currently on probation with [facility]. Patient has several past legals.    T) Explained counselor & supervisor license number and contact info, Patient Bill of Rights, program rules/regulations, Program Abuse Prevention Plan, confidentiality & HIPPA policies, grievance procedure, presented ROIs, TB & HIV/AIDS policies & resources, and Vulnerable Adult policy. Patient expected to start group on TBD. DA scheduled 03/20/19.      Anayeli Higgins  3/19/2019, 2:04 PM

## 2021-06-25 NOTE — PROGRESS NOTES
"Addiction Services - Initial Services Plan     Patient  Name: Gustavo Saleh Jr.  MRN: [id number]   : 1970  Admit Date: 19       Patient describes their immediate need: To learn recovery skills to prevent relapse. Grief and loss issues leading to depression. Learning more about the upswing in bi-polar.    Are there any immediate Safety Needs such as (physical, stability, mobility):  Pt is able to get medical care as needed. Pt denies immediate concerns.     Immediate Health Needs and Plan:   None    Vulnerable Adult: No     Issues to be addressed in the first sessions:   Orientation to program    Patient strengths and needs:   Strengths identified as \"Caring, willing, wanting sobriety for self, and I'm afraid to die.\"  Needs identified as \"Boredom, voids, energy, I need to fond hobbies, I got to be careful.\"    Plan for patient for time between intake and completion of the treatment plan:   Attend all group therapy sessions as directed, complete all written and oral assignments as directed, and remain clean and sober. A relapse, if any, must be reported to staff immediately in order to ensure you are receiving the proper level of care. If you cannot attend a group therapy session you must call contact information provided in intake folder and leave a message before or during group hours.       Vulnerable Adult Review   [X] Review of the Facility Abuse Prevention plan was reviewed with the patient   [X] No Individual Abuse Plan is necessary   [ ] In addition to the Facility Abuse Prevention plan, an Individual Abuse Plan will be put in place       Staff Name/Title: Anayeli Higgins   Date: 3/19/2019  Time: 2:02 PM          "
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
## 2021-11-18 NOTE — PROGRESS NOTES
Weekly Progress Note  Gustavo Saleh  1970  [id number]      D) Pt attended ZERO groups  this week with excused absences, patient communicated with staff regarding absences.   A) Staff facilitated groups and reviewed tx progress. Assessed for [facility]. R) Unable to assess along six dimensions or for [facility] due to lack of attendance.   T)  Patient has completed 90 of 90 program hours at this time. Projected discharge date is 6/30/18. Current discharge plan is Recovery Maintenance.     TATIANNA Valera, [facility]  6/29/2018, 1:51 PM       Weekly Educational Topics Date   1. Dual Diagnoses, week 1 2/13, 2/14; 5/16   2. Dual Diagnoses, week 2 2/19, 2/21; 5/22   3. Stress Management 2/26, 2/27, 2/28; 6/1   4. Feelings/Emotions 6/5, 6/8   5. Thinking 3/12, 3/13, 3/14, 3/16; 6/13   6. Change 3/20, 3/21; 6/18, 6/19   7. Recovery Support 3/28, 3/30   8. Relationships/Communication 4/3, 4/4, 4/6   9. Addiction 101 4/9, 4/11, 4/13   10. Relapse Prevention 4/17, 4/18   11. Grief and Loss 4/23   12. Strengths 4/30, 5/2   13. Wellness 5/7   Seeking Safety     Mindfulness  3/16, 3/30, 4/6, 4/13, 6/1          no

## 2023-10-19 NOTE — PROGRESS NOTES
Gustavo Saleh Jr. attended 2 hours of group therapy today.    6/19/2018 1:35 PM Stephy Sanz   Oxybutynin Counseling:  I discussed with the patient the risks of oxybutynin including but not limited to skin rash, drowsiness, dry mouth, difficulty urinating, and blurred vision.
